# Patient Record
Sex: FEMALE | Race: WHITE | Employment: OTHER | URBAN - METROPOLITAN AREA
[De-identification: names, ages, dates, MRNs, and addresses within clinical notes are randomized per-mention and may not be internally consistent; named-entity substitution may affect disease eponyms.]

---

## 2017-08-27 ENCOUNTER — HOSPITAL ENCOUNTER (EMERGENCY)
Age: 77
Discharge: HOME | End: 2017-08-27
Attending: EMERGENCY MEDICINE
Payer: MEDICARE

## 2017-08-27 VITALS
HEART RATE: 91 BPM | TEMPERATURE: 98.2 F | SYSTOLIC BLOOD PRESSURE: 128 MMHG | RESPIRATION RATE: 16 BRPM | OXYGEN SATURATION: 97 % | DIASTOLIC BLOOD PRESSURE: 67 MMHG

## 2017-08-27 DIAGNOSIS — S61.112A LACERATION OF LEFT THUMB WITHOUT FOREIGN BODY WITH DAMAGE TO NAIL, INITIAL ENCOUNTER: Primary | ICD-10-CM

## 2017-08-27 PROCEDURE — 2500000003 HC RX 250 WO HCPCS: Performed by: PHYSICIAN ASSISTANT

## 2017-08-27 PROCEDURE — 99282 EMERGENCY DEPT VISIT SF MDM: CPT

## 2017-08-27 PROCEDURE — 12001 RPR S/N/AX/GEN/TRNK 2.5CM/<: CPT

## 2017-08-27 PROCEDURE — 6370000000 HC RX 637 (ALT 250 FOR IP): Performed by: PHYSICIAN ASSISTANT

## 2017-08-27 RX ORDER — GINSENG 100 MG
CAPSULE ORAL ONCE
Status: COMPLETED | OUTPATIENT
Start: 2017-08-27 | End: 2017-08-27

## 2017-08-27 RX ORDER — LIDOCAINE HYDROCHLORIDE 10 MG/ML
5 INJECTION, SOLUTION EPIDURAL; INFILTRATION; INTRACAUDAL; PERINEURAL ONCE
Status: COMPLETED | OUTPATIENT
Start: 2017-08-27 | End: 2017-08-27

## 2017-08-27 RX ADMIN — LIDOCAINE HYDROCHLORIDE 5 ML: 10 INJECTION, SOLUTION EPIDURAL; INFILTRATION; INTRACAUDAL; PERINEURAL at 15:20

## 2017-08-27 RX ADMIN — BACITRACIN: 500 OINTMENT TOPICAL at 15:55

## 2017-08-27 ASSESSMENT — PAIN DESCRIPTION - LOCATION: LOCATION: HAND

## 2017-08-27 ASSESSMENT — ENCOUNTER SYMPTOMS
EYE REDNESS: 0
ABDOMINAL PAIN: 0
NAUSEA: 0
SHORTNESS OF BREATH: 0
COUGH: 0
EYE DISCHARGE: 0
BACK PAIN: 0
SORE THROAT: 0
VOMITING: 0

## 2017-08-27 ASSESSMENT — PAIN SCALES - GENERAL: PAINLEVEL_OUTOF10: 2

## 2017-08-27 ASSESSMENT — PAIN DESCRIPTION - PAIN TYPE: TYPE: ACUTE PAIN

## 2017-08-27 ASSESSMENT — PAIN DESCRIPTION - FREQUENCY: FREQUENCY: CONTINUOUS

## 2017-08-27 ASSESSMENT — PAIN DESCRIPTION - ORIENTATION: ORIENTATION: LEFT

## 2017-08-27 ASSESSMENT — PAIN DESCRIPTION - DESCRIPTORS: DESCRIPTORS: ACHING

## 2019-07-31 ENCOUNTER — HOSPITAL ENCOUNTER (OUTPATIENT)
Age: 79
Setting detail: SPECIMEN
Discharge: HOME | End: 2019-07-31
Payer: MEDICARE

## 2019-07-31 PROCEDURE — 83993 ASSAY FOR CALPROTECTIN FECAL: CPT

## 2019-07-31 PROCEDURE — 87493 C DIFF AMPLIFIED PROBE: CPT

## 2019-08-01 LAB
C DIFFICILE TOXINS, PCR: NORMAL
SPECIMEN DESCRIPTION: NORMAL

## 2019-08-03 LAB — CALPROTECTIN, FECAL: 40 UG/G

## 2021-12-27 RX ORDER — ONDANSETRON 2 MG/ML
8 INJECTION INTRAMUSCULAR; INTRAVENOUS
OUTPATIENT
Start: 2021-12-27

## 2021-12-27 RX ORDER — SODIUM CHLORIDE 9 MG/ML
INJECTION, SOLUTION INTRAVENOUS CONTINUOUS
OUTPATIENT
Start: 2021-12-27

## 2021-12-27 RX ORDER — ACETAMINOPHEN 325 MG/1
650 TABLET ORAL
OUTPATIENT
Start: 2021-12-27

## 2021-12-27 RX ORDER — SODIUM CHLORIDE 9 MG/ML
25 INJECTION, SOLUTION INTRAVENOUS PRN
OUTPATIENT
Start: 2021-12-27

## 2021-12-27 RX ORDER — DIPHENHYDRAMINE HYDROCHLORIDE 50 MG/ML
50 INJECTION INTRAMUSCULAR; INTRAVENOUS
OUTPATIENT
Start: 2021-12-27

## 2021-12-27 RX ORDER — SODIUM CHLORIDE 9 MG/ML
INJECTION, SOLUTION INTRAVENOUS ONCE
OUTPATIENT
Start: 2021-12-27 | End: 2021-12-27

## 2021-12-27 RX ORDER — SODIUM CHLORIDE 0.9 % (FLUSH) 0.9 %
5-40 SYRINGE (ML) INJECTION PRN
OUTPATIENT
Start: 2021-12-27

## 2021-12-27 RX ORDER — ALBUTEROL SULFATE 90 UG/1
4 AEROSOL, METERED RESPIRATORY (INHALATION) PRN
OUTPATIENT
Start: 2021-12-27

## 2021-12-27 RX ORDER — EPINEPHRINE 1 MG/ML
0.3 INJECTION, SOLUTION, CONCENTRATE INTRAVENOUS PRN
OUTPATIENT
Start: 2021-12-27

## 2021-12-27 RX ORDER — HEPARIN SODIUM (PORCINE) LOCK FLUSH IV SOLN 100 UNIT/ML 100 UNIT/ML
500 SOLUTION INTRAVENOUS PRN
OUTPATIENT
Start: 2021-12-27

## 2021-12-27 RX ORDER — ZOLEDRONIC ACID 5 MG/100ML
5 INJECTION, SOLUTION INTRAVENOUS ONCE
OUTPATIENT
Start: 2021-12-27 | End: 2021-12-27

## 2022-01-04 ENCOUNTER — TELEPHONE (OUTPATIENT)
Dept: ONCOLOGY | Age: 82
End: 2022-01-04

## 2022-03-28 RX ORDER — DORZOLAMIDE HCL 20 MG/ML
1 SOLUTION/ DROPS OPHTHALMIC DAILY
COMMUNITY

## 2022-03-30 ENCOUNTER — HOSPITAL ENCOUNTER (OUTPATIENT)
Age: 82
Setting detail: OUTPATIENT SURGERY
Discharge: HOME | End: 2022-03-30
Attending: PLASTIC SURGERY | Admitting: PLASTIC SURGERY
Payer: MEDICARE

## 2022-03-30 VITALS
BODY MASS INDEX: 27.05 KG/M2 | HEIGHT: 62 IN | TEMPERATURE: 98.3 F | OXYGEN SATURATION: 99 % | WEIGHT: 147 LBS | DIASTOLIC BLOOD PRESSURE: 73 MMHG | HEART RATE: 58 BPM | SYSTOLIC BLOOD PRESSURE: 145 MMHG

## 2022-03-30 PROCEDURE — 2709999900 HC NON-CHARGEABLE SUPPLY: Performed by: PLASTIC SURGERY

## 2022-03-30 PROCEDURE — 7100000010 HC PHASE II RECOVERY - FIRST 15 MIN: Performed by: PLASTIC SURGERY

## 2022-03-30 PROCEDURE — 3600000012 HC SURGERY LEVEL 2 ADDTL 15MIN: Performed by: PLASTIC SURGERY

## 2022-03-30 PROCEDURE — 2500000003 HC RX 250 WO HCPCS: Performed by: PLASTIC SURGERY

## 2022-03-30 PROCEDURE — 7100000011 HC PHASE II RECOVERY - ADDTL 15 MIN: Performed by: PLASTIC SURGERY

## 2022-03-30 PROCEDURE — 88305 TISSUE EXAM BY PATHOLOGIST: CPT

## 2022-03-30 PROCEDURE — 3600000002 HC SURGERY LEVEL 2 BASE: Performed by: PLASTIC SURGERY

## 2022-03-30 ASSESSMENT — PAIN SCALES - GENERAL: PAINLEVEL_OUTOF10: 0

## 2022-03-30 NOTE — H&P
Subjective:      Patient ID: Jaxon Mehta is a 79 y.o. female.     HPI  Patient presents today lesion on the back.     Medical History      Medical History    Diagnosis Date Comment Source   Anxiety      Basal cell carcinoma of skin of lower limb, including hip 10/22/2014     Basal cell carcinoma of skin of other and unspecified parts of face 10/22/2014     Cerebral artery occlusion with cerebral infarction [facility]) 2017     Colitis      Glaucoma           Other Medical History    Diagnosis Date Comment Source   Diarrhea  malabsorption           Family History    No family history on file. Social History      Tobacco History    Smoking Status   Never Smoker   Smokeless Tobacco Use   Never Used     Alcohol History    Alcohol Use Status   No     Drug Use    Drug Use Status   No     Sexual Activity    Sexually Active   Not Asked      Surgical History    Procedure Laterality Date Comment Source   ANKLE SURGERY       APPENDECTOMY       COLONOSCOPY       CYST REMOVAL Left 1994 wrist    MANDIBLE SURGERY   correction of overbite    SKIN BIOPSY  1992 upper back--nodular subepidermal fibroma, back, neck--epidermal inclusion cyst    SKIN BIOPSY  2009 nose--bcc    SKIN BIOPSY  2012 rt shin--bcc, rt cheek--epidermoid cyst    TUBAL LIGATION         E-Cigarettes/Vaping Use    Questions Responses   E-Cigarette/Vaping Use    Start Date    Passive Exposure    Quit Date    Counseling Given    Comments      Socioeconomic History      Employment History    No employment history on file. Family and Education    Marital Status        Social Identity    Preferred Language Ethnicity Race   English Non- / Non  White (non-)             No current facility-administered medications on file prior to encounter.      Current Outpatient Medications on File Prior to Encounter   Medication Sig Dispense Refill    dorzolamide (TRUSOPT) 2 % ophthalmic solution dorzolamide 2 % eye drops   INSTILL 1 DROP INTO BOTH EYES TWICE A DAY      lamoTRIgine (LAMICTAL) 150 MG tablet Take 150 mg by mouth daily.  APRISO 0.375 G capsule Take 1.5 g by mouth daily.  methylphenidate (RITALIN) 5 MG tablet Take 5 mg by mouth See Admin Instructions. 5 mg in morning, 2.5 mg in evening.  sertraline (ZOLOFT) 25 MG tablet Take 12.5 mg by mouth daily. (Patient not taking: Reported on 3/28/2022)      timolol (TIMOPTIC) 0.5 % ophthalmic solution Place 1 drop into both eyes daily.  calcium carbonate (OSCAL) 500 MG TABS tablet Take 500 mg by mouth daily.  Vitamin D (CHOLECALCIFEROL) 1000 UNITS CAPS capsule Take 3,000 Units by mouth daily.  DULoxetine (CYMBALTA) 60 MG capsule Take 60 mg by mouth daily.  Ascorbic Acid (VITAMIN C) 500 MG tablet Take 500 mg by mouth daily.  Multiple Vitamins-Minerals (DAILY MULTIPLE VITAMINS/MIN PO) Take 1 tablet by mouth daily.            Objective:   Physical Exam  Vitals and nursing note reviewed. Exam conducted with a chaperone present. Constitutional:       Appearance: Normal appearance. HENT:      Head: Normocephalic and atraumatic. Mouth/Throat:      Mouth: Mucous membranes are moist.   Eyes:      Extraocular Movements: Extraocular movements intact. Conjunctiva/sclera: Conjunctivae normal.      Pupils: Pupils are equal, round, and reactive to light. Pulmonary:      Effort: Pulmonary effort is normal.   Musculoskeletal:        Back:    Skin:     General: Skin is warm and dry. Neurological:      General: No focal deficit present. Mental Status: She is alert and oriented to person, place, and time.       Checked over head, neck, shoulders, upper extremities abdomen and back. Multiple SK's and lesion as diagrammed.     Assessment:   Suspicious lesion of back. Plan:   Scheduled for excision.   I have discussed with the patient the indication, alternatives, and the possible risks and/or complications which include but are not limited to those delineated on the consent form for the planned procedure and the anesthesia methods. All questions were answered to patient's satisfaction. Consent was reviewed and signed.

## 2022-03-31 NOTE — OP NOTE
[address]                  [facility]                                OPERATIVE REPORT    PATIENT NAME: Charles De La Rosa                  :        1940  MED REC NO:   [id number]                             ROOM:  ACCOUNT NO:   [de-identified]                           ADMIT DATE: 2022  PROVIDER:     Sara Vazquez    DATE OF PROCEDURE:  2022    PREOPERATIVE DIAGNOSIS:  Suspicious lesion of back. POSTOPERATIVE DIAGNOSIS:  Suspicious lesion of back. PROCEDURE:  Excision of lesion of back (2.0 cm), taken with a 4-mm  margin and with intermediate repair of 3.5 cm length. SURGEON:  Sara Vazquez MD    ANESTHESIA:  Local 1% Xylocaine with epinephrine, buffered. INDICATIONS:  The patient is an 79-year-old white female who presents  with a suspicious lesion on her back, here for removal and diagnosis. The procedure, the risks, the benefits were discussed with her. All  questions were answered to her satisfaction. Consent was signed. NARRATIVE SUMMARY:  The patient was brought to the operating suite,  placed in the prone position. She was then prepped and draped in usual  sterile fashion. Initially, using marker, outline was made for the  excision along with margin. Local anesthetic infiltrated. Next, with  scalpel, incision was carried out around the perimeter of the margin and  deepened through the full-thickness of the skin. It was then excised  inclusive of underlying subcutaneous tissues and sent off as specimen. Bovie cautery was used for hemostasis. Wound closed in layers with  interrupted 4-0 Vicryl in the subcutaneous and running intracuticular  4-0 Prolene in the skin. Steri-Strips for dressing. The patient  tolerated the procedure well. Blood loss minimal.  Specimens x1. Complications none. The patient was transferred to Recovery in stable  condition.         Morro Tao    D: 03/30/2022 16:50:22       T: 03/30/2022 16:52:51     [id number]  Job#: [id number]     Doc#: [id number]    CC:

## 2022-04-01 LAB — DERMATOLOGY PATHOLOGY REPORT: NORMAL

## 2022-04-20 PROBLEM — C44.519 BASAL CELL CARCINOMA OF BACK: Status: ACTIVE | Noted: 2022-04-20

## 2022-04-25 ENCOUNTER — HOSPITAL ENCOUNTER (EMERGENCY)
Age: 82
Discharge: HOME | End: 2022-04-25
Attending: EMERGENCY MEDICINE
Payer: MEDICARE

## 2022-04-25 ENCOUNTER — APPOINTMENT (OUTPATIENT)
Dept: GENERAL RADIOLOGY | Age: 82
End: 2022-04-25
Payer: MEDICARE

## 2022-04-25 VITALS
HEIGHT: 62 IN | OXYGEN SATURATION: 97 % | HEART RATE: 62 BPM | DIASTOLIC BLOOD PRESSURE: 72 MMHG | SYSTOLIC BLOOD PRESSURE: 172 MMHG | RESPIRATION RATE: 14 BRPM | TEMPERATURE: 98.3 F | WEIGHT: 145 LBS | BODY MASS INDEX: 26.68 KG/M2

## 2022-04-25 DIAGNOSIS — S61.213A LACERATION OF LEFT MIDDLE FINGER WITHOUT FOREIGN BODY WITHOUT DAMAGE TO NAIL, INITIAL ENCOUNTER: Primary | ICD-10-CM

## 2022-04-25 PROCEDURE — 73130 X-RAY EXAM OF HAND: CPT

## 2022-04-25 PROCEDURE — 99283 EMERGENCY DEPT VISIT LOW MDM: CPT

## 2022-04-25 PROCEDURE — 12001 RPR S/N/AX/GEN/TRNK 2.5CM/<: CPT

## 2022-04-25 ASSESSMENT — ENCOUNTER SYMPTOMS
EYES NEGATIVE: 1
GASTROINTESTINAL NEGATIVE: 1
RESPIRATORY NEGATIVE: 1

## 2022-04-25 ASSESSMENT — PAIN - FUNCTIONAL ASSESSMENT: PAIN_FUNCTIONAL_ASSESSMENT: NONE - DENIES PAIN

## 2022-04-25 ASSESSMENT — PAIN SCALES - GENERAL: PAINLEVEL_OUTOF10: 0

## 2022-04-25 ASSESSMENT — PAIN DESCRIPTION - ORIENTATION: ORIENTATION: LEFT

## 2022-04-25 ASSESSMENT — PAIN DESCRIPTION - ONSET: ONSET: SUDDEN

## 2022-04-25 ASSESSMENT — PAIN DESCRIPTION - PAIN TYPE: TYPE: ACUTE PAIN

## 2022-04-25 ASSESSMENT — PAIN DESCRIPTION - LOCATION: LOCATION: FINGER (COMMENT WHICH ONE)

## 2022-04-25 NOTE — ED PROVIDER NOTES
[facility] ED  eMERGENCY dEPARTMENT eNCOUnter   Independent Attestation     Pt Name: Ashutosh Madrid  MRN: [id number]  Armsjosefinagfcrystal 1940  Date of evaluation: 4/25/22       Ashutosh Madrid is a 79 y.o. female who presents with Finger Injury (patient caught her middle finger on her left hand in a reclining chair.)        Based on the medical record, the care appears appropriate. I was personally available for consultation in the Emergency Department.     Sylvia Calixto MD  Attending Emergency  Physician                Sylvia Calixto MD  04/25/22 [id number]

## 2022-04-25 NOTE — ED TRIAGE NOTES
Patient came to the ER with a chief complaint of a finger laceration. Patient got her finger caught in the mechanism of a reclining chair. Patient has a laceration on her left middle finger, no bleeding at time of assessment. Patient was alert and oriented x 4. Patient triaged to room 2, assessed, and updated on the plan of care.

## 2022-04-25 NOTE — ED PROVIDER NOTES
[address] [facility] ED  [address] [facility] [address]  Phone: [phone]  Fax: [phone]        Pt Name: Dorothy Davis  MRN: [id number]  [place] 1940  Date of evaluation: 4/25/22    [address] [place] [address]       Chief Complaint   Patient presents with    Finger Injury     patient caught her middle finger on her left hand in a reclining chair. HISTORY OF PRESENT ILLNESS (Location/Symptom, Timing/Onset, Context/Setting, Quality, Duration, Modifying Factors, Severity)      Dorothy Davis is a 79 y.o. female with no pertinent PMH who presents to the ED via private auto with complaints of a laceration to the left middle finger. Patient states she was trying to open a recliner, when her finger got stuck, in the mechanisms, she states she pulled her finger out very quickly and she noticed a cut on her left middle finger. She denies any pain currently, states it really only hurts when it is touched. She states that the incident occurred last night. She denies any numbness or tingling in her extremities. She states she is up-to-date on her tetanus immunization. She denies any dizziness or lightheadedness. Denies any chest pain shortness of breath. She denies any fever chills or body aches. Patient is right-hand dominant. PAST MEDICAL / SURGICAL / SOCIAL / FAMILY HISTORY     PMH:  has a past medical history of Anxiety, Basal cell carcinoma of skin of lower limb, including hip, Basal cell carcinoma of skin of other and unspecified parts of face, Cerebral artery occlusion with cerebral infarction ([facility] [address] Colitis, Diarrhea, and Glaucoma. Surgical History:  has a past surgical history that includes Appendectomy; skin biopsy (1992); cyst removal (Left, 1994); skin biopsy (2009); skin biopsy (2012); Ankle surgery; Colonoscopy; Tubal ligation; Mandible surgery; pre-malignant / benign skin lesion excision (03/30/2022); and skin biopsy (N/A, 3/30/2022).   Social History:  reports that she has never smoked. She has never used smokeless tobacco. She reports that she does not drink alcohol and does not use drugs. Family History: has no family status information on file. family history is not on file. Psychiatric History: None    Allergies: Latex, Ciprofloxacin, and Ketorolac    Home Medications:   Prior to Admission medications    Medication Sig Start Date End Date Taking? Authorizing Provider   dorzolamide (TRUSOPT) 2 % ophthalmic solution dorzolamide 2 % eye drops   INSTILL 1 DROP INTO BOTH EYES TWICE A DAY    Historical Provider, MD   cephALEXin (KEFLEX) 250 MG capsule Take one PO QID. Start day before surgery. 3/10/22   Kirby Mccurdy MD   lamoTRIgine (LAMICTAL) 150 MG tablet Take 150 mg by mouth daily. 10/22/14   Historical Provider, MD   APRISO 0.375 G capsule Take 1.5 g by mouth daily. 10/20/14   Historical Provider, MD   methylphenidate (RITALIN) 5 MG tablet Take 5 mg by mouth See Admin Instructions. 5 mg in morning, 2.5 mg in evening. 10/20/14   Historical Provider, MD   sertraline (ZOLOFT) 25 MG tablet Take 12.5 mg by mouth daily. Patient not taking: Reported on 3/28/2022 10/22/14   Historical Provider, MD   timolol (TIMOPTIC) 0.5 % ophthalmic solution Place 1 drop into both eyes daily. 10/15/14   Historical Provider, MD   calcium carbonate (OSCAL) 500 MG TABS tablet Take 500 mg by mouth daily. Historical Provider, MD   Vitamin D (CHOLECALCIFEROL) 1000 UNITS CAPS capsule Take 3,000 Units by mouth daily. Historical Provider, MD   DULoxetine (CYMBALTA) 60 MG capsule Take 60 mg by mouth daily. Historical Provider, MD   Ascorbic Acid (VITAMIN C) 500 MG tablet Take 500 mg by mouth daily. Historical Provider, MD   Multiple Vitamins-Minerals (DAILY MULTIPLE VITAMINS/MIN PO) Take 1 tablet by mouth daily. Historical Provider, MD       REVIEW OF SYSTEMS  (2-9 systems for level 4, 10 ormore for level 5)      Review of Systems   Constitutional: Negative.     HENT: Negative. Eyes: Negative. Respiratory: Negative. Cardiovascular: Negative. Gastrointestinal: Negative. Endocrine: Negative. Genitourinary: Negative. Musculoskeletal:        Left middle finger injury   Skin:        Laceration to left middle finger   Neurological: Negative. Hematological: Negative. Psychiatric/Behavioral: Negative. All other systems negative except as marked. PHYSICAL EXAM  (up to 7 for level 4, 8 or more for level 5)      INITIAL VITALS:  height is 5' 2\" (1.575 m) and weight is 65.8 kg (145 lb). Her temperature is 98.3 °F (36.8 °C). Her blood pressure is 172/72 (abnormal) and her pulse is 62. Her respiration is 14 and oxygen saturation is 97%. Vital signs reviewed. Physical Exam  Constitutional:       Appearance: Normal appearance. HENT:      Head: Normocephalic. Right Ear: Tympanic membrane, ear canal and external ear normal.      Left Ear: Tympanic membrane, ear canal and external ear normal.      Nose: Nose normal.      Mouth/Throat:      Mouth: Mucous membranes are moist.      Pharynx: Oropharynx is clear. Eyes:      Extraocular Movements: Extraocular movements intact. Pupils: Pupils are equal, round, and reactive to light. Cardiovascular:      Rate and Rhythm: Normal rate and regular rhythm. Pulses: Normal pulses. Heart sounds: Normal heart sounds. Pulmonary:      Effort: Pulmonary effort is normal.      Breath sounds: Normal breath sounds. Abdominal:      General: Bowel sounds are normal.      Palpations: Abdomen is soft. Musculoskeletal:         General: Normal range of motion. Cervical back: Normal range of motion. Skin:     General: Skin is warm and dry. Capillary Refill: Capillary refill takes less than 2 seconds.       Comments: 1 cm laceration noted to the palmar aspect of patient's left middle finger, over the distal phalanx   Neurological:      Mental Status: She is alert and oriented to person, place, and time. Psychiatric:         Mood and Affect: Mood normal.         Behavior: Behavior normal.         Thought Content: Thought content normal.         Judgment: Judgment normal.           DIFFERENTIAL DIAGNOSIS / MDM     Upon exam, patient is resting comfortably in her room. Denies need for any pain medication at this time. She has full range of motion of her left hand and fingers. She denies any numbness or tingling in her extremities. Heart sounds within normal limits upon auscultation. Lung sounds are clear and equal bilaterally. Bowel sounds are present with auscultation. Upon examination of the left hand, there is a 1 cm laceration noted to the palmar aspect of patient's left middle finger, over the distal phalanx. Patient has good capillary refill. She has full range of motion of the finger. There is some surrounding swelling noted as well. No signs of infection including red streaking, purulent discharge, surrounding erythema. As stated above, patient is up-to-date on her tetanus immunization. I will order an x-ray of the left hand to rule out fracture dislocation. X-ray of the left hand is negative for fracture. Dermabond applied to the laceration. Laceration came down nicely. Patient has good capillary refill following the procedure. No signs of nail damage or foreign body noted during irrigation. Band-Aid applied to the site. Patient is encouraged to avoid submerging her hand, will provide information on adhesives on the discharge instructions. Patient states understanding of education and is stable for outpatient follow-up with her PCP. She is encouraged to return the emergency room with any new concerning or worsening symptoms. Patient educated on signs infection including red streaking, increasing swelling, purulent discharge, surrounding erythema. Patient states understanding of education.     PLAN (LABS / IMAGING / EKG):  Orders Placed This Encounter Procedures    XR HAND LEFT (MIN 3 VIEWS)       MEDICATIONS ORDERED:  No orders of the defined types were placed in this encounter. Controlled Substances Monitoring:     DIAGNOSTIC RESULTS     EKG: All EKG's are interpreted by the Emergency Department Physician who either signs or Co-signs this chart in the absenceof a cardiologist.      RADIOLOGY: All images are read by the radiologist and their interpretations are reviewed. XR HAND LEFT (MIN 3 VIEWS)   Final Result   No fracture             No results found. LABS:  No results found for this visit on 04/25/22. EMERGENCY DEPARTMENT COURSE           Vitals:    Vitals:    04/25/22 1350   BP: (!) 172/72   Pulse: 62   Resp: 14   Temp: 98.3 °F (36.8 °C)   SpO2: 97%   Weight: 65.8 kg (145 lb)   Height: 5' 2\" (1.575 m)     -------------------------  BP: (!) 172/72, Temp: 98.3 °F (36.8 °C), Pulse: 62, Resp: 14      RE-EVALUATION:  See ED Course notes above. CONSULTS:  None    PROCEDURES:  Dermabond applied to the roughly 1 cm laceration noted to the left middle finger, palmar aspect. Patient tolerated this well, good capillary refill following the application. Band-Aid applied. No signs of cyanosis or change in skin temp noted. FINAL IMPRESSION      1. Laceration of left middle finger without foreign body without damage to nail, initial encounter          DISPOSITION / PLAN     CONDITION ON DISPOSITION:   Good / Stable for discharge. PATIENT REFERRED TO:  Ophelia Kim MD  [address]  [address] [facility] [phone]    Call   As needed    [facility] ED  [address]   [address] [address]  [phone]  Go to   If symptoms worsen      DISCHARGE MEDICATIONS:  Discharge Medication List as of 4/25/2022  3:22 PM          OMAR Gutierrez - NP   Emergency Medicine Nurse Practitioner    (Please note that portions of this note were completed with a voice recognition program.  Efforts were made to edit the dictations but occasionally words aremis-transcribed.)     OMAR Kincaid NP  04/25/22 1038

## 2022-09-11 ENCOUNTER — APPOINTMENT (OUTPATIENT)
Dept: CT IMAGING | Age: 82
DRG: 835 | End: 2022-09-11
Payer: MEDICARE

## 2022-09-11 ENCOUNTER — APPOINTMENT (OUTPATIENT)
Dept: GENERAL RADIOLOGY | Age: 82
DRG: 835 | End: 2022-09-11
Payer: MEDICARE

## 2022-09-11 ENCOUNTER — HOSPITAL ENCOUNTER (INPATIENT)
Age: 82
LOS: 1 days | Discharge: HOSPICE | DRG: 835 | End: 2022-09-13
Attending: EMERGENCY MEDICINE | Admitting: STUDENT IN AN ORGANIZED HEALTH CARE EDUCATION/TRAINING PROGRAM
Payer: MEDICARE

## 2022-09-11 DIAGNOSIS — J18.9 PNEUMONIA OF RIGHT LUNG DUE TO INFECTIOUS ORGANISM, UNSPECIFIED PART OF LUNG: ICD-10-CM

## 2022-09-11 DIAGNOSIS — S01.01XA LACERATION OF SCALP, INITIAL ENCOUNTER: ICD-10-CM
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
## 2022-09-11 DIAGNOSIS — D64.9 ANEMIA, UNSPECIFIED TYPE: ICD-10-CM

## 2022-09-11 DIAGNOSIS — J18.9 PNEUMONIA DUE TO INFECTIOUS ORGANISM, UNSPECIFIED LATERALITY, UNSPECIFIED PART OF LUNG: ICD-10-CM

## 2022-09-11 DIAGNOSIS — J90 PLEURAL EFFUSION ON RIGHT: ICD-10-CM

## 2022-09-11 DIAGNOSIS — J18.9 PNEUMONIA OF RIGHT LOWER LOBE DUE TO INFECTIOUS ORGANISM: ICD-10-CM

## 2022-09-11 LAB
ANION GAP SERPL CALCULATED.3IONS-SCNC: 9 MMOL/L (ref 9–17)
BUN BLDV-MCNC: 30 MG/DL (ref 8–23)
CALCIUM SERPL-MCNC: 8.4 MG/DL (ref 8.6–10.4)
CHLORIDE BLD-SCNC: 94 MMOL/L (ref 98–107)
CO2: 27 MMOL/L (ref 20–31)
CREAT SERPL-MCNC: 0.59 MG/DL (ref 0.5–0.9)
GFR AFRICAN AMERICAN: >60 ML/MIN
GFR NON-AFRICAN AMERICAN: >60 ML/MIN
GFR SERPL CREATININE-BSD FRML MDRD: ABNORMAL ML/MIN/{1.73_M2}
GLUCOSE BLD-MCNC: 118 MG/DL (ref 70–99)
HCT VFR BLD CALC: 19.4 % (ref 36–46)
HEMOGLOBIN: 6.4 G/DL (ref 12–16)
POTASSIUM SERPL-SCNC: 4.2 MMOL/L (ref 3.7–5.3)
SODIUM BLD-SCNC: 130 MMOL/L (ref 135–144)

## 2022-09-11 PROCEDURE — 85025 COMPLETE CBC W/AUTO DIFF WBC: CPT

## 2022-09-11 PROCEDURE — 86920 COMPATIBILITY TEST SPIN: CPT

## 2022-09-11 PROCEDURE — 86902 BLOOD TYPE ANTIGEN DONOR EA: CPT

## 2022-09-11 PROCEDURE — 70450 CT HEAD/BRAIN W/O DYE: CPT

## 2022-09-11 PROCEDURE — 36415 COLL VENOUS BLD VENIPUNCTURE: CPT

## 2022-09-11 PROCEDURE — 85018 HEMOGLOBIN: CPT

## 2022-09-11 PROCEDURE — 80048 BASIC METABOLIC PNL TOTAL CA: CPT

## 2022-09-11 PROCEDURE — 71046 X-RAY EXAM CHEST 2 VIEWS: CPT

## 2022-09-11 PROCEDURE — 84145 PROCALCITONIN (PCT): CPT

## 2022-09-11 PROCEDURE — 86870 RBC ANTIBODY IDENTIFICATION: CPT

## 2022-09-11 PROCEDURE — 99285 EMERGENCY DEPT VISIT HI MDM: CPT

## 2022-09-11 PROCEDURE — 72125 CT NECK SPINE W/O DYE: CPT

## 2022-09-11 PROCEDURE — 86880 COOMBS TEST DIRECT: CPT

## 2022-09-11 PROCEDURE — 85014 HEMATOCRIT: CPT

## 2022-09-11 PROCEDURE — 86900 BLOOD TYPING SEROLOGIC ABO: CPT

## 2022-09-11 PROCEDURE — 86901 BLOOD TYPING SEROLOGIC RH(D): CPT

## 2022-09-11 PROCEDURE — 86850 RBC ANTIBODY SCREEN: CPT

## 2022-09-11 RX ORDER — ALPRAZOLAM 0.25 MG/1
TABLET ORAL
Status: ON HOLD | COMMUNITY
Start: 2022-08-28 | End: 2022-09-12 | Stop reason: HOSPADM

## 2022-09-11 RX ORDER — CHOLECALCIFEROL (VITAMIN D3) 125 MCG
10 CAPSULE ORAL
COMMUNITY
Start: 2022-08-29 | End: 2022-09-12

## 2022-09-11 RX ORDER — SODIUM CHLORIDE 9 MG/ML
INJECTION, SOLUTION INTRAVENOUS PRN
Status: DISCONTINUED | OUTPATIENT
Start: 2022-09-11 | End: 2022-09-13 | Stop reason: HOSPADM

## 2022-09-11 ASSESSMENT — PAIN - FUNCTIONAL ASSESSMENT: PAIN_FUNCTIONAL_ASSESSMENT: ADULT NONVERBAL PAIN SCALE (NPVS)

## 2022-09-11 NOTE — Clinical Note
Discharge Plan[de-identified] Other/[facility])   Telemetry/Cardiac Monitoring Required?: Yes

## 2022-09-11 NOTE — ED NOTES
Annie TURCIOS ok with lab draw vs peripheral iv , lab at bedside      Leatha Fletcher RN  09/11/22 1913

## 2022-09-11 NOTE — ED PROVIDER NOTES
[id number] [facility] ED  [id number] [facility] [address]  Phone: [phone]  Fax: [phone]      Attending Physician Attestation    I performed a history and physical examination of the patient and discussed management with the mid level provider. I reviewed the mid level provider's note and agree with the documented findings and plan of care. Any areas of disagreement are noted on the chart. I was personally present for the key portions of any procedures. I have documented in the chart those procedures where I was not present during the key portions. I have reviewed the emergency nurses triage note. I agree with the chief complaint, past medical history, past surgical history, allergies, medications, social and family history as documented unless otherwise noted below. Documentation of the HPI, Physical Exam and Medical Decision Making performed by mid level providers is based on my personal performance of the HPI, PE and MDM. For Physician Assistant/ Nurse Practitioner cases/documentation I have personally evaluated this patient and have completed at least one if not all key elements of the E/M (history, physical exam, and MDM). Additional findings are as noted. CHIEF COMPLAINT       Chief Complaint   Patient presents with    Head Injury         HISTORY OF PRESENT ILLNESS    Odalys Page is a 79 y.o. female with history of AML, CVA, anxiety, depression, colitis, and anemia who is on hospice who presents for evaluation after a fall. The patient had a unwitnessed fall while her family was outside and they found her lying in the kitchen bleeding from her head. The patient hit the edge of the baseboard when she fell and has a laceration to her left parietal scalp. She does not take any anticoagulants. The patient fell from standing. She is in hospice but upon arrival the patient's family states that she is full code.   They have been transfusing her blood secondary to worsening anemia. It is unclear if the patient lost consciousness. She complains of pain around the laceration. The patient denies any provoking or palliating factors. She has not taken any medications prior to arrival.  Her tetanus shot is up-to-date. The patient denies fever, chills, vision changes, neck pain, back pain, chest pain, shortness of breath, abdominal pain, urinary/bowel symptoms, nausea, vomiting, or recent illness. PAST MEDICAL HISTORY    has a past medical history of Anxiety, Basal cell carcinoma of skin of lower limb, including hip, Basal cell carcinoma of skin of other and unspecified parts of face, Cerebral artery occlusion with cerebral infarction ([facility] [address] Colitis, Diarrhea, and Glaucoma. SURGICAL HISTORY      has a past surgical history that includes Appendectomy; skin biopsy (1992); cyst removal (Left, 1994); skin biopsy (2009); skin biopsy (2012); Ankle surgery; Colonoscopy; Tubal ligation; Mandible surgery; pre-malignant / benign skin lesion excision (03/30/2022); and skin biopsy (N/A, 3/30/2022). CURRENT MEDICATIONS       Previous Medications    ALPRAZOLAM (XANAX) 0.25 MG TABLET    Take by mouth. APRISO 0.375 G CAPSULE    Take 1.5 g by mouth daily. ASCORBIC ACID (VITAMIN C) 500 MG TABLET    Take 500 mg by mouth daily. CALCIUM CARBONATE (OSCAL) 500 MG TABS TABLET    Take 500 mg by mouth daily. CEPHALEXIN (KEFLEX) 250 MG CAPSULE    Take one PO QID. Start day before surgery. DORZOLAMIDE (TRUSOPT) 2 % OPHTHALMIC SOLUTION    dorzolamide 2 % eye drops   INSTILL 1 DROP INTO BOTH EYES TWICE A DAY    DULOXETINE (CYMBALTA) 60 MG CAPSULE    Take 60 mg by mouth daily. LAMOTRIGINE (LAMICTAL) 150 MG TABLET    Take 150 mg by mouth daily. MELATONIN 5 MG TABS TABLET    Take 10 mg by mouth    METHYLPHENIDATE (RITALIN) 5 MG TABLET    Take 5 mg by mouth See Admin Instructions. 5 mg in morning, 2.5 mg in evening.     MULTIPLE VITAMINS-MINERALS (DAILY MULTIPLE VITAMINS/MIN PO) Take 1 tablet by mouth daily. SERTRALINE (ZOLOFT) 25 MG TABLET    Take 12.5 mg by mouth daily. TIMOLOL (TIMOPTIC) 0.5 % OPHTHALMIC SOLUTION    Place 1 drop into both eyes daily. VITAMIN D (CHOLECALCIFEROL) 1000 UNITS CAPS CAPSULE    Take 3,000 Units by mouth daily. ALLERGIES     is allergic to latex, ciprofloxacin, and ketorolac. FAMILY HISTORY     has no family status information on file. family history is not on file. SOCIAL HISTORY      reports that she has never smoked. She has never used smokeless tobacco. She reports that she does not drink alcohol and does not use drugs. PHYSICAL EXAM     INITIAL VITALS:  height is 5' 2\" (1.575 m) and weight is 54.4 kg (120 lb). Her tympanic temperature is 97.8 °F (36.6 °C). Her blood pressure is 114/63 and her pulse is 99. Her respiration is 18 and oxygen saturation is 100%. Heart regular rate and rhythm. Lungs clear to auscultation. Abdomen soft and nontender. No midline spinal tenderness to palpation, step-off or deformity. There is a small laceration noted to the left parietal scalp with active bleeding. No hemotympanum, storng sign, raccoon eyes or septal hematoma. Pupils 3 mm, equal, round reactive to light. Extraocular movements intact. Normal  strength bilaterally. Able to move all 4 extremities. DIAGNOSTIC RESULTS     EKG: All EKG's are interpreted by the Emergency Department Physician who either signs or Co-signs this chart in the absence of a cardiologist.    None    RADIOLOGY:     XR CHEST (2 VW)    Result Date: 9/11/2022  EXAMINATION: TWO XRAY VIEWS OF THE CHEST 9/11/2022 7:04 pm COMPARISON: None. HISTORY: ORDERING SYSTEM PROVIDED HISTORY: right pleural effusion TECHNOLOGIST PROVIDED HISTORY: right pleural effusion Reason for Exam: Right pleural effusion FINDINGS: Right perihilar consolidative infiltrate. Fairly diffuse interstitial infiltrate throughout the remaining right lung.   Moderate right pleural effusion. Left lung is relatively clear. .The cardiac size is mildly enlarged. . Pulmonary vascularity appears congested on the right. There is mild ectasia of the thoracic aorta. There are degenerative changes in the spine and bilateral shoulders. No acute bony abnormalities. Right perihilar consolidative infiltrate. Fairly diffuse interstitial infiltrate throughout the remaining right lung suggesting pneumonia. Moderate right pleural effusion. RECOMMENDATION: Recommend follow-up exams until the infiltrate clears     CT HEAD WO CONTRAST    Result Date: 9/11/2022  EXAMINATION: CT OF THE HEAD WITHOUT CONTRAST  9/11/2022 4:36 pm TECHNIQUE: CT of the head was performed without the administration of intravenous contrast. Automated exposure control, iterative reconstruction, and/or weight based adjustment of the mA/kV was utilized to reduce the radiation dose to as low as reasonably achievable. COMPARISON: None. HISTORY: ORDERING SYSTEM PROVIDED HISTORY: head injury TECHNOLOGIST PROVIDED HISTORY: head injury Decision Support Exception - unselect if not a suspected or confirmed emergency medical condition->Emergency Medical Condition (MA) Reason for Exam: Fall, posterior head injury/laceration CT BRAIN FINDINGS: BRAIN/VENTRICLES: The cerebral hemispheres, brainstem, and cerebellum have a normal appearance for the patient's age. The falx is midline. The ventricles and peripheral sulci are mildly dilated. There is decreased attenuation in the periventricular white matter. There is no sign of a space occupying lesion, infarction, or hemorrhage. Orbits: Portion of the orbits demonstrate no acute abnormality. SINUSES: Mild mucoperiosteal thickening of the maxillary sinuses. .  The remaining imaged portions of the paranasal sinuses are clear. The mastoids and the middle ear chambers are clear. SOFT TISSUES/SKULL:  No acute abnormality of the visualized skull. Left parietal soft tissue swelling. .Vascular calcifications visualization of large volume right pleural effusion. LABS:  Results for orders placed or performed during the hospital encounter of 09/11/22   CBC with Auto Differential   Result Value Ref Range    WBC 3.2 (L) 3.5 - 11.0 k/uL    RBC 2.33 (L) 4.0 - 5.2 m/uL    Hemoglobin 7.1 (L) 12.0 - 16.0 g/dL    Hematocrit 21.6 (L) 36 - 46 %    MCV 92.7 80 - 100 fL    MCH 30.3 26 - 34 pg    MCHC 32.7 31 - 37 g/dL    RDW 19.4 (H) 12.5 - 15.4 %    Platelets 78 (L) 937 - 450 k/uL    MPV 12.0 6.0 - 12.0 fL    Seg Neutrophils PENDING %    Lymphocytes PENDING %    Monocytes PENDING %    Eosinophils % PENDING %    Basophils PENDING %    Segs Absolute PENDING k/uL    Absolute Lymph # PENDING k/uL    Absolute Mono # PENDING k/uL    Absolute Eos # PENDING k/uL    Basophils Absolute PENDING 0.0 - 0.2 k/uL   Basic Metabolic Panel   Result Value Ref Range    Glucose 118 (H) 70 - 99 mg/dL    BUN 30 (H) 8 - 23 mg/dL    Creatinine 0.59 0.50 - 0.90 mg/dL    Calcium 8.4 (L) 8.6 - 10.4 mg/dL    Sodium 130 (L) 135 - 144 mmol/L    Potassium 4.2 3.7 - 5.3 mmol/L    Chloride 94 (L) 98 - 107 mmol/L    CO2 27 20 - 31 mmol/L    Anion Gap 9 9 - 17 mmol/L    GFR Non-African American >60 >60 mL/min    GFR African American >60 >60 mL/min    GFR Comment         Hemoglobin and Hematocrit   Result Value Ref Range    Hemoglobin 6.4 (LL) 12.0 - 16.0 g/dL    Hematocrit 19.4 (L) 36 - 46 %           EMERGENCY DEPARTMENT COURSE:   Vitals:    Vitals:    09/11/22 2113 09/11/22 2128 09/11/22 2143 09/11/22 2158   BP: (!) 124/59 (!) 123/58 124/68 114/63   Pulse:       Resp:       Temp:       TempSrc:       SpO2: 100%      Weight:       Height:         -------------------------  BP: 114/63, Temp: 97.8 °F (36.6 °C), Heart Rate: 99, Resp: 18      PERTINENT ATTENDING PHYSICIAN COMMENTS:    The patient is a 11-year-old female who presents for evaluation of a head injury with scalp laceration after a fall. Vital signs are stable.   She has a left parietal scalp laceration that was bleeding. The wound was anesthetized, cleaned, and closed with simple interrupted sutures and a single figure-of-eight suture to help control bleeding. No signs of basilar skull fracture. No focal neurologic deficits on exam.  She has history of anemia requiring recurrent blood transfusions and family is concerned that she lost a significant amount of blood today which would require a transfusion. The patient is on hospice for lymphoma but arrives today as a full code. After discussion with the family and hospice nurse, the patient's CODE STATUS was changed to DNR CC. The family would still like to pursue CT imaging of the head and neck as well as blood work. CT head shows mild central and cortical cerebral atrophy. Moderate chronic deep white matter ischemic changes. No acute intracranial abnormality. Left parietal scalp hematoma. CT cervical spine shows no acute abnormality. There are diffuse degenerative changes and acquired cervical spinal canal stenosis most severe at C6/7. The patient does not have any cervical tenderness to palpation or complain of any neck pain. There was partial visualization of a large volume right pleural effusion. We subsequently ordered a chest x-ray which shows a right perihilar consolidative infiltrate with fairly diffuse interstitial infiltrate throughout the remaining right lung suggesting pneumonia. There is a moderate right pleural effusion. I subsequently started the patient on Rocephin and azithromycin. The family is concerned for MAC infections. The azithromycin should cover for MAC. CBC shows a hemoglobin of 7.1 with platelets of 78. BMP shows slight hyponatremia and hypochloremia. We repeated the H&H secondary to the patient's blood loss and hemoglobin did drop down to 6.4. The patient was subsequently typed and screened and 1 unit of PRBC was ordered.   The patient has antibodies so her blood had to be sent to [facility] in order to be crossmatched. The patient will be admitted for blood transfusion and treatment of her pneumonia. The family was updated. We spoke to Jovany Zambrano NP, and she agrees to accept the patient for admission to [facility]. CRITICAL CARE: There was a high probability of clinically significant/life threatening deterioration in this patient's condition which required my urgent intervention. Total critical care time was 35 minutes. This excludes any time for separately reportable procedures.          (Please note that portions of this note were completed with a voice recognition program.  Efforts were made to edit the dictations but occasionally words are mis-transcribed.)    Stephanie Aleman DO, [address] [facility]  Attending Emergency Medicine Physician        Stephanie Aleman DO  09/12/22 Sohan Schumacher DO  09/12/22 0142

## 2022-09-12 PROBLEM — D62 ACUTE BLOOD LOSS ANEMIA: Status: ACTIVE | Noted: 2022-09-12
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
## 2022-09-12 PROBLEM — C92.00 ACUTE MYELOBLASTIC LEUKEMIA, NOT HAVING ACHIEVED REMISSION (HCC): Status: ACTIVE | Noted: 2022-08-29

## 2022-09-12 PROBLEM — J90 PLEURAL EFFUSION: Status: ACTIVE | Noted: 2022-09-12

## 2022-09-12 PROBLEM — D64.9 ANEMIA: Status: ACTIVE | Noted: 2022-09-12

## 2022-09-12 PROBLEM — J18.9 PNEUMONIA DUE TO INFECTIOUS ORGANISM: Status: ACTIVE | Noted: 2022-09-12

## 2022-09-12 PROBLEM — D61.818 PANCYTOPENIA (HCC): Status: ACTIVE | Noted: 2022-07-27

## 2022-09-12 PROBLEM — F03.90 DEMENTIA (HCC): Status: ACTIVE | Noted: 2022-09-12

## 2022-09-12 LAB
ABSOLUTE EOS #: 0.38 K/UL (ref 0–0.4)
ABSOLUTE IMMATURE GRANULOCYTE: 0.19 K/UL (ref 0–0.3)
ABSOLUTE LYMPH #: 0.67 K/UL (ref 1–4.8)
ABSOLUTE MONO #: 0.06 K/UL (ref 0.1–0.8)
BASOPHILS # BLD: 0 % (ref 0–2)
BASOPHILS ABSOLUTE: 0 K/UL (ref 0–0.2)
BLASTS ABSOLUTE COUNT: 0.03 K/UL
BLASTS: 1 %
EOSINOPHILS RELATIVE PERCENT: 12 % (ref 1–4)
HCT VFR BLD CALC: 21.6 % (ref 36–46)
HCT VFR BLD CALC: 26.6 % (ref 36–46)
HEMOGLOBIN: 7.1 G/DL (ref 12–16)
HEMOGLOBIN: 9 G/DL (ref 12–16)
IMMATURE GRANULOCYTES: 6 %
LYMPHOCYTES # BLD: 21 % (ref 24–44)
MCH RBC QN AUTO: 30.3 PG (ref 26–34)
MCHC RBC AUTO-ENTMCNC: 32.7 G/DL (ref 31–37)
MCV RBC AUTO: 92.7 FL (ref 80–100)
MONOCYTES # BLD: 2 % (ref 1–7)
MORPHOLOGY: ABNORMAL
PDW BLD-RTO: 19.4 % (ref 12.5–15.4)
PLATELET # BLD: 78 K/UL (ref 140–450)
PMV BLD AUTO: 12 FL (ref 6–12)
PROCALCITONIN: 0.9 NG/ML
RBC # BLD: 2.33 M/UL (ref 4–5.2)
SEG NEUTROPHILS: 58 % (ref 36–66)
SEGMENTED NEUTROPHILS ABSOLUTE COUNT: 1.87 K/UL (ref 1.8–7.7)
WBC # BLD: 3.2 K/UL (ref 3.5–11)

## 2022-09-12 PROCEDURE — 87040 BLOOD CULTURE FOR BACTERIA: CPT

## 2022-09-12 PROCEDURE — 6360000002 HC RX W HCPCS: Performed by: EMERGENCY MEDICINE

## 2022-09-12 PROCEDURE — 6360000002 HC RX W HCPCS

## 2022-09-12 PROCEDURE — 1210000000 HC MED SURG R&B

## 2022-09-12 PROCEDURE — G0378 HOSPITAL OBSERVATION PER HR: HCPCS

## 2022-09-12 PROCEDURE — 6370000000 HC RX 637 (ALT 250 FOR IP): Performed by: EMERGENCY MEDICINE

## 2022-09-12 PROCEDURE — 85014 HEMATOCRIT: CPT

## 2022-09-12 PROCEDURE — 36430 TRANSFUSION BLD/BLD COMPNT: CPT

## 2022-09-12 PROCEDURE — 2580000003 HC RX 258: Performed by: EMERGENCY MEDICINE

## 2022-09-12 PROCEDURE — 99222 1ST HOSP IP/OBS MODERATE 55: CPT | Performed by: INTERNAL MEDICINE

## 2022-09-12 PROCEDURE — 36415 COLL VENOUS BLD VENIPUNCTURE: CPT

## 2022-09-12 PROCEDURE — 85018 HEMOGLOBIN: CPT

## 2022-09-12 PROCEDURE — 2700000000 HC OXYGEN THERAPY PER DAY

## 2022-09-12 PROCEDURE — 87641 MR-STAPH DNA AMP PROBE: CPT

## 2022-09-12 PROCEDURE — 96365 THER/PROPH/DIAG IV INF INIT: CPT

## 2022-09-12 PROCEDURE — 6370000000 HC RX 637 (ALT 250 FOR IP): Performed by: NURSE PRACTITIONER

## 2022-09-12 PROCEDURE — 6370000000 HC RX 637 (ALT 250 FOR IP): Performed by: STUDENT IN AN ORGANIZED HEALTH CARE EDUCATION/TRAINING PROGRAM

## 2022-09-12 PROCEDURE — P9016 RBC LEUKOCYTES REDUCED: HCPCS

## 2022-09-12 PROCEDURE — 86900 BLOOD TYPING SEROLOGIC ABO: CPT

## 2022-09-12 PROCEDURE — 2580000003 HC RX 258: Performed by: NURSE PRACTITIONER

## 2022-09-12 PROCEDURE — 99222 1ST HOSP IP/OBS MODERATE 55: CPT | Performed by: STUDENT IN AN ORGANIZED HEALTH CARE EDUCATION/TRAINING PROGRAM

## 2022-09-12 RX ORDER — ALPRAZOLAM 0.25 MG/1
0.25 TABLET ORAL DAILY
Status: DISCONTINUED | OUTPATIENT
Start: 2022-09-12 | End: 2022-09-13

## 2022-09-12 RX ORDER — TIMOLOL MALEATE 5 MG/ML
1 SOLUTION/ DROPS OPHTHALMIC DAILY
Status: DISCONTINUED | OUTPATIENT
Start: 2022-09-12 | End: 2022-09-13 | Stop reason: HOSPADM

## 2022-09-12 RX ORDER — AMOXICILLIN AND CLAVULANATE POTASSIUM 875; 125 MG/1; MG/1
1 TABLET, FILM COATED ORAL 2 TIMES DAILY
Qty: 28 TABLET | Refills: 0 | Status: SHIPPED
Start: 2022-09-12 | End: 2022-09-12 | Stop reason: HOSPADM

## 2022-09-12 RX ORDER — ALPRAZOLAM 0.25 MG/1
0.25 TABLET ORAL 3 TIMES DAILY PRN
Status: DISCONTINUED | OUTPATIENT
Start: 2022-09-12 | End: 2022-09-13 | Stop reason: HOSPADM

## 2022-09-12 RX ORDER — ACETAMINOPHEN 325 MG/1
650 TABLET ORAL EVERY 6 HOURS PRN
Status: DISCONTINUED | OUTPATIENT
Start: 2022-09-12 | End: 2022-09-13 | Stop reason: HOSPADM

## 2022-09-12 RX ORDER — POTASSIUM CHLORIDE 20 MEQ/1
40 TABLET, EXTENDED RELEASE ORAL PRN
Status: DISCONTINUED | OUTPATIENT
Start: 2022-09-12 | End: 2022-09-13 | Stop reason: HOSPADM

## 2022-09-12 RX ORDER — AZITHROMYCIN 500 MG/1
INJECTION, POWDER, LYOPHILIZED, FOR SOLUTION INTRAVENOUS
Status: COMPLETED
Start: 2022-09-12 | End: 2022-09-12

## 2022-09-12 RX ORDER — CHOLECALCIFEROL (VITAMIN D3) 125 MCG
5 CAPSULE ORAL ONCE
Status: DISCONTINUED | OUTPATIENT
Start: 2022-09-12 | End: 2022-09-13 | Stop reason: HOSPADM

## 2022-09-12 RX ORDER — ACETAMINOPHEN 650 MG/1
650 SUPPOSITORY RECTAL EVERY 6 HOURS PRN
Status: DISCONTINUED | OUTPATIENT
Start: 2022-09-12 | End: 2022-09-13 | Stop reason: HOSPADM

## 2022-09-12 RX ORDER — DULOXETIN HYDROCHLORIDE 20 MG/1
60 CAPSULE, DELAYED RELEASE ORAL DAILY
Status: DISCONTINUED | OUTPATIENT
Start: 2022-09-12 | End: 2022-09-13 | Stop reason: HOSPADM

## 2022-09-12 RX ORDER — ONDANSETRON 4 MG/1
4 TABLET, ORALLY DISINTEGRATING ORAL EVERY 8 HOURS PRN
Status: DISCONTINUED | OUTPATIENT
Start: 2022-09-12 | End: 2022-09-13 | Stop reason: HOSPADM

## 2022-09-12 RX ORDER — SODIUM CHLORIDE 0.9 % (FLUSH) 0.9 %
5-40 SYRINGE (ML) INJECTION EVERY 12 HOURS SCHEDULED
Status: DISCONTINUED | OUTPATIENT
Start: 2022-09-12 | End: 2022-09-13 | Stop reason: HOSPADM

## 2022-09-12 RX ORDER — ALPRAZOLAM 0.25 MG/1
0.25 TABLET ORAL 2 TIMES DAILY
COMMUNITY

## 2022-09-12 RX ORDER — SODIUM CHLORIDE 9 MG/ML
INJECTION, SOLUTION INTRAVENOUS PRN
Status: DISCONTINUED | OUTPATIENT
Start: 2022-09-12 | End: 2022-09-13 | Stop reason: HOSPADM

## 2022-09-12 RX ORDER — ONDANSETRON 2 MG/ML
4 INJECTION INTRAMUSCULAR; INTRAVENOUS EVERY 6 HOURS PRN
Status: DISCONTINUED | OUTPATIENT
Start: 2022-09-12 | End: 2022-09-13 | Stop reason: HOSPADM

## 2022-09-12 RX ORDER — CHOLECALCIFEROL (VITAMIN D3) 125 MCG
CAPSULE ORAL
Status: DISPENSED
Start: 2022-09-12 | End: 2022-09-12

## 2022-09-12 RX ORDER — DOXYCYCLINE HYCLATE 100 MG
100 TABLET ORAL 2 TIMES DAILY
Qty: 20 TABLET | Refills: 0 | Status: SHIPPED
Start: 2022-09-12 | End: 2022-09-12 | Stop reason: HOSPADM

## 2022-09-12 RX ORDER — METHYLPHENIDATE HYDROCHLORIDE 5 MG/1
5 TABLET ORAL SEE ADMIN INSTRUCTIONS
Status: DISCONTINUED | OUTPATIENT
Start: 2022-09-12 | End: 2022-09-13 | Stop reason: HOSPADM

## 2022-09-12 RX ORDER — LANOLIN ALCOHOL/MO/W.PET/CERES
5 CREAM (GRAM) TOPICAL NIGHTLY
COMMUNITY

## 2022-09-12 RX ORDER — POTASSIUM CHLORIDE 7.45 MG/ML
10 INJECTION INTRAVENOUS PRN
Status: DISCONTINUED | OUTPATIENT
Start: 2022-09-12 | End: 2022-09-13 | Stop reason: HOSPADM

## 2022-09-12 RX ORDER — DORZOLAMIDE HCL 20 MG/ML
1 SOLUTION/ DROPS OPHTHALMIC 2 TIMES DAILY
Status: DISCONTINUED | OUTPATIENT
Start: 2022-09-12 | End: 2022-09-13 | Stop reason: HOSPADM

## 2022-09-12 RX ORDER — ALPRAZOLAM 0.25 MG/1
0.25 TABLET ORAL ONCE
Status: COMPLETED | OUTPATIENT
Start: 2022-09-12 | End: 2022-09-12

## 2022-09-12 RX ORDER — SODIUM CHLORIDE 0.9 % (FLUSH) 0.9 %
10 SYRINGE (ML) INJECTION PRN
Status: DISCONTINUED | OUTPATIENT
Start: 2022-09-12 | End: 2022-09-13 | Stop reason: HOSPADM

## 2022-09-12 RX ORDER — POLYETHYLENE GLYCOL 3350 17 G/17G
17 POWDER, FOR SOLUTION ORAL DAILY PRN
Status: DISCONTINUED | OUTPATIENT
Start: 2022-09-12 | End: 2022-09-13 | Stop reason: HOSPADM

## 2022-09-12 RX ORDER — OXYBUTYNIN CHLORIDE 5 MG/1
2.5 TABLET ORAL 2 TIMES DAILY
COMMUNITY

## 2022-09-12 RX ORDER — CHOLECALCIFEROL (VITAMIN D3) 125 MCG
10 CAPSULE ORAL NIGHTLY PRN
Status: DISCONTINUED | OUTPATIENT
Start: 2022-09-12 | End: 2022-09-13 | Stop reason: HOSPADM

## 2022-09-12 RX ORDER — ONDANSETRON 4 MG/1
4 TABLET, FILM COATED ORAL EVERY 6 HOURS PRN
COMMUNITY

## 2022-09-12 RX ORDER — ONDANSETRON 4 MG/1
4 TABLET, FILM COATED ORAL EVERY 8 HOURS PRN
Status: ON HOLD | COMMUNITY
End: 2022-09-12 | Stop reason: HOSPADM

## 2022-09-12 RX ORDER — ALPRAZOLAM 0.25 MG/1
0.25 TABLET ORAL 3 TIMES DAILY PRN
COMMUNITY

## 2022-09-12 RX ORDER — MAGNESIUM SULFATE 1 G/100ML
1000 INJECTION INTRAVENOUS PRN
Status: DISCONTINUED | OUTPATIENT
Start: 2022-09-12 | End: 2022-09-13 | Stop reason: HOSPADM

## 2022-09-12 RX ORDER — AZITHROMYCIN 500 MG/1
500 TABLET, FILM COATED ORAL DAILY
Qty: 14 TABLET | Refills: 0 | Status: SHIPPED
Start: 2022-09-12 | End: 2022-09-12 | Stop reason: HOSPADM

## 2022-09-12 RX ORDER — AMOXICILLIN AND CLAVULANATE POTASSIUM 875; 125 MG/1; MG/1
1 TABLET, FILM COATED ORAL 2 TIMES DAILY
Qty: 20 TABLET | Refills: 0 | Status: SHIPPED | OUTPATIENT
Start: 2022-09-12 | End: 2022-09-12 | Stop reason: SDUPTHER

## 2022-09-12 RX ADMIN — ALPRAZOLAM 0.25 MG: 0.25 TABLET ORAL at 10:57

## 2022-09-12 RX ADMIN — ALPRAZOLAM 0.25 MG: 0.25 TABLET ORAL at 01:16

## 2022-09-12 RX ADMIN — TIMOLOL MALEATE 1 DROP: 5 SOLUTION/ DROPS OPHTHALMIC at 10:58

## 2022-09-12 RX ADMIN — SODIUM CHLORIDE, PRESERVATIVE FREE 10 ML: 5 INJECTION INTRAVENOUS at 10:58

## 2022-09-12 RX ADMIN — ALPRAZOLAM 0.25 MG: 0.25 TABLET ORAL at 17:26

## 2022-09-12 RX ADMIN — DORZOLAMIDE HYDROCHLORIDE 1 DROP: 20 SOLUTION/ DROPS OPHTHALMIC at 21:18

## 2022-09-12 RX ADMIN — CEFTRIAXONE SODIUM 1000 MG: 1 INJECTION, POWDER, FOR SOLUTION INTRAMUSCULAR; INTRAVENOUS at 04:43

## 2022-09-12 RX ADMIN — DORZOLAMIDE HYDROCHLORIDE 1 DROP: 20 SOLUTION/ DROPS OPHTHALMIC at 10:58

## 2022-09-12 RX ADMIN — AZITHROMYCIN MONOHYDRATE 500 MG: 500 INJECTION, POWDER, LYOPHILIZED, FOR SOLUTION INTRAVENOUS at 02:49

## 2022-09-12 RX ADMIN — DULOXETINE 60 MG: 20 CAPSULE, DELAYED RELEASE ORAL at 10:57

## 2022-09-12 ASSESSMENT — PAIN SCALES - GENERAL: PAINLEVEL_OUTOF10: 0

## 2022-09-12 NOTE — ED NOTES
Late entry     Pt back to room post CT upon entry to pt room RN noted pt to have extensive amount of bleeding noted to hair, towel , draw sheet and chux. Upon removal of towel Laceration noted to be actively bleeding. Pulsation noted with bleed. Pressure immediately applied to head. Vitals obtained. LOC baseline with evaluation and Family at bedside . Family applies pressure for RN to exit room. RN notified E. Aracelis PA . MARY BETH Azevedop to bedside for evaluation. PA sutures bedside. Pt hair washed at bedside. Pt linens changed new gown applied.       Daksha Kang RN  09/11/22 7810       Daksha Kang RN  09/11/22 [address] [facility], RN  09/11/22 4236

## 2022-09-12 NOTE — H&P
hemoglobin of 6.4. Chest x-ray was done and was remarkable for a right perihilar consolidative infiltrate suggestive of pneumonia. The family states that the patient was recently diagnosed with MAC pneumonia and state that they would like this treated. The patient is a DNR-CC and is under Hospice care at home. The patient's daughter states that the patient herself does not want to be admitted to the hospital but family has requested admission for blood transfusion and antibiotics. This physician explained to the patient's daughter that there is no indication for hospital admission for pneumonia as the patient is not symptomatic and has not yet tried outpatient antibiotics. The patient's daughter states that her mother was recently diagnosed with MAC pneumonia and appears to be very concerned about this (states that she believes infection has contributed more to the patient's current state of health than leukemia). The patient did not wish to be examined at bedside this morning. Plan to transfuse 1 unit PRBC for anemia with anticipated discharge home with Hospice later today. This physician addressed goals of care with family at bedside. I explained that it is unusual for patients under Hospice care to be admitted to the hospital as this does not contribute to patient comfort and specifically asked family if they wished to revoke Hospice care. The family would like to continue comfort care at this time. Interval update: The patient will need to be admitted for IV antibiotics and blood transfusion per Hospice attending physician (Dr. Amairani Michael). Plan to consult infectious disease per family's request. Will also consult pulmonology for further evaluation of right pleural effusion. The patient may benefit from therapeutic/diagnostic thoracentesis if the family chooses to continue aggressive care.      Past Medical History:     Past Medical History:   Diagnosis Date    Anxiety     Basal cell carcinoma of skin of lower limb, including hip 10/22/2014    Basal cell carcinoma of skin of other and unspecified parts of face 10/22/2014    Cerebral artery occlusion with cerebral infarction [facility]) 2017    Colitis     Diarrhea     malabsorption    Glaucoma         Past Surgical History:     Past Surgical History:   Procedure Laterality Date    ANKLE SURGERY      APPENDECTOMY      COLONOSCOPY      CYST REMOVAL Left 1994    wrist    MANDIBLE SURGERY      correction of overbite    PRE-MALIGNANT / BENIGN SKIN LESION EXCISION  03/30/2022    SKIN BIOPSY  1992    upper back--nodular subepidermal fibroma, back, neck--epidermal inclusion cyst    SKIN BIOPSY  2009    nose--bcc    SKIN BIOPSY  2012    rt shin--bcc, rt cheek--epidermoid cyst    SKIN BIOPSY N/A 3/30/2022    BACK LESION BIOPSY EXCISION performed by Carmelina Lawler MD at [address] [facility],[address]          Medications Prior to Admission:     Prior to Admission medications    Medication Sig Start Date End Date Taking? Authorizing Provider   amoxicillin-clavulanate (AUGMENTIN) 875-125 MG per tablet Take 1 tablet by mouth 2 times daily for 14 days 9/12/22 9/26/22 Yes Jordan Hubbard MD   [facility]) 500 MG tablet Take 1 tablet by mouth daily for 14 days 9/12/22 9/26/22 Yes Jordan Hubbard MD   ALPRAZolam Alice Sear) 0.25 MG tablet Take by mouth. 8/28/22  Yes Historical Provider, MD   melatonin 5 MG TABS tablet Take 10 mg by mouth 8/29/22 9/12/22  Historical Provider, MD   dorzolamide (TRUSOPT) 2 % ophthalmic solution dorzolamide 2 % eye drops   INSTILL 1 DROP INTO BOTH EYES TWICE A DAY    Historical Provider, MD   methylphenidate (RITALIN) 5 MG tablet Take 5 mg by mouth See Admin Instructions. 5 mg in morning, 2.5 mg in evening. 10/20/14   Historical Provider, MD   timolol (TIMOPTIC) 0.5 % ophthalmic solution Place 1 drop into both eyes daily. 10/15/14   Historical Provider, MD   lamoTRIgine (LAMICTAL) 150 MG tablet Take 150 mg by mouth daily.   Patient not taking: Reported on 2022 10/22/14 9/12/22  Historical Provider, MD   APRISO 0.375 G capsule Take 1.5 g by mouth daily. Patient not taking: Reported on 2022 10/20/14 9/12/22  Historical Provider, MD   sertraline (ZOLOFT) 25 MG tablet Take 12.5 mg by mouth daily. Patient not taking: No sig reported 10/22/14 9/12/22  Historical Provider, MD   calcium carbonate (OSCAL) 500 MG TABS tablet Take 500 mg by mouth daily. Patient not taking: Reported on 2022  Historical Provider, MD   Vitamin D (CHOLECALCIFEROL) 1000 UNITS CAPS capsule Take 3,000 Units by mouth daily. Patient not taking: Reported on 2022  Historical Provider, MD   DULoxetine (CYMBALTA) 60 MG capsule Take 60 mg by mouth daily. Historical Provider, MD   Ascorbic Acid (VITAMIN C) 500 MG tablet Take 500 mg by mouth daily. Patient not taking: Reported on 2022  Historical Provider, MD   Multiple Vitamins-Minerals (DAILY MULTIPLE VITAMINS/MIN PO) Take 1 tablet by mouth daily. Patient not taking: Reported on 2022  Historical Provider, MD        Allergies:     Latex, Ciprofloxacin, and Ketorolac    Social History:     Tobacco:    reports that she has never smoked. She has never used smokeless tobacco.  Alcohol:      reports no history of alcohol use. Drug Use:  reports no history of drug use. Family History:     History reviewed. No pertinent family history. Review of Systems:     Unable to obtain ROS due to baseline dementia. Physical Exam:   BP (!) 123/59   Pulse 99   Temp 97.8 °F (36.6 °C) (Tympanic)   Resp 18   Ht 5' 2\" (1.575 m)   Wt 120 lb (54.4 kg)   SpO2 90%   BMI 21.95 kg/m²   Temp (24hrs), Av.8 °F (36.6 °C), Min:97.8 °F (36.6 °C), Max:97.8 °F (36.6 °C)    No results for input(s): POCGLU in the last 72 hours. No intake or output data in the 24 hours ending 22 0743    General Appearance:  Frail appearing.    Mental status: oriented to person   Head:  Laceration appreciated on posterior scalp. Eye: no icterus, redness, pupils equal and reactive, extraocular eye movements intact, conjunctiva clear  Ear: normal external ear, no discharge, hearing intact  Nose:  no drainage noted  Mouth: mucous membranes moist  Neck: supple, no carotid bruits, thyroid not palpable  Lungs: Bilateral equal air entry, clear to ausculation, no wheezing, rales or rhonchi, normal effort  Cardiovascular: normal rate, regular rhythm, no murmur, gallop, rub  Abdomen: Soft, nontender, nondistended, normal bowel sounds, no hepatomegaly or splenomegaly  Neurologic: There are no new focal motor or sensory deficits, normal muscle tone and bulk, no abnormal sensation, normal speech, cranial nerves II through XII grossly intact  Skin: Posterior scalp wound appreciated.    Extremities:  peripheral pulses palpable, no pedal edema or calf pain with palpation  Psych: normal affect     Investigations:      Laboratory Testing:  Recent Results (from the past 24 hour(s))   CBC with Auto Differential    Collection Time: 09/11/22  7:16 PM   Result Value Ref Range    WBC 3.2 (L) 3.5 - 11.0 k/uL    RBC 2.33 (L) 4.0 - 5.2 m/uL    Hemoglobin 7.1 (L) 12.0 - 16.0 g/dL    Hematocrit 21.6 (L) 36 - 46 %    MCV 92.7 80 - 100 fL    MCH 30.3 26 - 34 pg    MCHC 32.7 31 - 37 g/dL    RDW 19.4 (H) 12.5 - 15.4 %    Platelets 78 (L) 529 - 450 k/uL    MPV 12.0 6.0 - 12.0 fL    Seg Neutrophils PENDING %    Lymphocytes PENDING %    Monocytes PENDING %    Eosinophils % PENDING %    Basophils PENDING %    Segs Absolute PENDING k/uL    Absolute Lymph # PENDING k/uL    Absolute Mono # PENDING k/uL    Absolute Eos # PENDING k/uL    Basophils Absolute PENDING 0.0 - 0.2 k/uL   Basic Metabolic Panel    Collection Time: 09/11/22  7:16 PM   Result Value Ref Range    Glucose 118 (H) 70 - 99 mg/dL    BUN 30 (H) 8 - 23 mg/dL    Creatinine 0.59 0.50 - 0.90 mg/dL    Calcium 8.4 (L) 8.6 - 10.4 mg/dL    Sodium 130 (L) 135 - 144 mmol/L    Potassium 4.2 3.7 - 5.3 mmol/L    Chloride 94 (L) 98 - 107 mmol/L    CO2 27 20 - 31 mmol/L    Anion Gap 9 9 - 17 mmol/L    GFR Non-African American >60 >60 mL/min    GFR African American >60 >60 mL/min    GFR Comment         Hemoglobin and Hematocrit    Collection Time: 09/11/22  9:01 PM   Result Value Ref Range    Hemoglobin 6.4 (LL) 12.0 - 16.0 g/dL    Hematocrit 19.4 (L) 36 - 46 %   TYPE AND SCREEN    Collection Time: 09/11/22 10:04 PM   Result Value Ref Range    Expiration Date 09/14/2022,2359     Arm Band Number [id number]     ABO/Rh B POSITIVE     Antibody Screen POSITIVE     Antibody ID Anti-Vasquez(A) Present     BONI IgG POSITIVE     Unit Number [id number]     Product Code Leukocyte Reduced Red Cell     Unit Divison 00     Dispense Status RETURNED/TRANSFERRED     Transfusion Status OK TO TRANSFUSE     Crossmatch Result COMPATIBLE     Unit Number [id number]     Product Code Leukocyte Reduced Red Cell     Unit Divison 00     Dispense Status RETURNED/TRANSFERRED     Transfusion Status OK TO TRANSFUSE     Crossmatch Result COMPATIBLE        Imaging/Diagnostics:  XR CHEST (2 VW)    Result Date: 9/11/2022  Right perihilar consolidative infiltrate. Fairly diffuse interstitial infiltrate throughout the remaining right lung suggesting pneumonia. Moderate right pleural effusion. RECOMMENDATION: Recommend follow-up exams until the infiltrate clears     CT HEAD WO CONTRAST    Result Date: 9/11/2022  Mild central and cortical cerebral atrophy. Moderate chronic deep white matter ischemic changes No acute intracranial abnormalities are noted. Left parietal scalp hematoma     CT CERVICAL SPINE WO CONTRAST    Result Date: 9/11/2022  1. No acute abnormality of the cervical spine. 2. Diffuse degenerative changes of mild-to-moderate severity. 3. Acquired cervical spinal canal stenosis mild-to-moderate severity, most severe at C6-7 at 6 mm AP dimension. MRI correlation may be indicated patient develops cord related or radicular-type symptoms. According gross may cannot be excluded. 4. Partial visualization of large volume right pleural effusion. Assessment :      Hospital Problems             Last Modified POA    * (Principal) Acute blood loss anemia 9/12/2022 Yes    Acute myeloblastic leukemia, not having achieved remission ([address] 9/12/2022 Yes    Anxiety disorder 9/12/2022 Yes    Dementia ([facility] [address] 9/12/2022 Yes    Pancytopenia ([facility] [address] 9/12/2022 Yes    Pneumonia due to infectious organism 9/12/2022 Yes    Anemia 9/12/2022 Yes       Plan:     Patient status observation in the Med/Surge    Anemia   -Secondary to AML   -Transfuse 1 unit PRBC   -Daily CBC    Pneumonia   -CXR showing RLL pneumonia   -Per family, the patient was recently diagnosed with MAC and they would like an infectious disease consult   -Start ceftriaxone and azithromycin   -Procalcitonin pending   -Strep pneumoniae antigen pending   -Legionella antigen pending   -MRSA nasal probe pending   -Sputum culture   -Infectious disease has been consulted per family's request   -Daily CBC  -Daily BMP     Pleural effusion   -CXR showing a moderate right pleural effusion   -Will consult pulmonology to assess need for thoracentesis     AML   -Will consult oncology should family choose to revoke Hospice     Disposition: The on-call Hospice physician was contacted and agrees with admission for blood transfusion and IV antibiotics. Anticipate discharge once treatment plan for MAC has been finalized.      Consultations:   None      Jordan Hubbard MD  9/12/2022  7:43 AM    Copy sent to Dr. Eliazar Bumpers, MD

## 2022-09-12 NOTE — DISCHARGE INSTR - COC
Continuity of Care Form    Patient Name: Diony Malcolm   :  1940  MRN:  [id number]    Admit date:  2022  Discharge date:  ***    Code Status Order: [facility]   Advance Directives:     Admitting Physician:  No admitting provider for patient encounter. PCP: Latosha Betancourt MD    Discharging Nurse: [facility] Unit/Room#: [address]  Discharging Unit Phone Number: ***    Emergency Contact:   Extended Emergency Contact Information  Primary Emergency Contact: isha [facility] Phone: [phone]  Relation: Child  Secondary Emergency Contact: [address] [place] [address] Phone: [phone]  Relation: Child    Past Surgical History:  Past Surgical History:   Procedure Laterality Date    ANKLE SURGERY      APPENDECTOMY      COLONOSCOPY      CYST REMOVAL Left 1994    wrist    MANDIBLE SURGERY      correction of overbite    PRE-MALIGNANT / BENIGN SKIN LESION EXCISION  2022    SKIN BIOPSY      upper back--nodular subepidermal fibroma, back, neck--epidermal inclusion cyst    SKIN BIOPSY      nose--bcc    SKIN BIOPSY      rt shin--bcc, rt cheek--epidermoid cyst    SKIN BIOPSY N/A 3/30/2022    BACK LESION BIOPSY EXCISION performed by Johan Estrella MD at [address] [facility],[address]         Immunization History: There is no immunization history on file for this patient.     Active Problems:  Patient Active Problem List   Diagnosis Code    Basal cell carcinoma of skin of other parts of face C44.319    Basal cell carcinoma of skin of lower limb, including hip C44.711    Basal cell carcinoma of back C44.519    Acute blood loss anemia D62    Acute myeloblastic leukemia, not having achieved remission ([facility]) C92.00    Anxiety disorder F41.9    Dementia ([facility]) F03.90    Pancytopenia ([facility]) D61.818    Pneumonia due to infectious organism J18.9       Isolation/Infection:   Isolation            No Isolation          Patient Infection Status       None to display            Nurse Assessment:  Last Vital Signs: BP (!) 123/49   Pulse 99   Temp 97.8 °F (36.6 °C) (Tympanic)   Resp 18   Ht 5' 2\" (1.575 m)   Wt 120 lb (54.4 kg)   SpO2 90%   BMI 21.95 kg/m²     Last documented pain score (0-10 scale):    Last Weight:   Wt Readings from Last 1 Encounters:   22 120 lb (54.4 kg)     Mental Status:  {IP PT MENTAL STATUS:06278}    IV Access:  { LUIS ANTONIO IV ACCESS:703183280}    Nursing Mobility/ADLs:  Walking   {CHP DME ETAU:986180532}  Transfer  {CHP DME HWIP:089821924}  Bathing  {CHP DME FJYW:154769557}  Dressing  {CHP DME DVIE:587572638}  Toileting  {CHP DME BZZJ:506813080}  Feeding  {CHP DME UPHK:150058212}  Med Admin  {P DME EILV:938574555}  Med Delivery   { LUIS ANTONIO MED Delivery:591332866}    Wound Care Documentation and Therapy:  Incision 22 Back Medial;Upper (Active)   Number of days: 166        Elimination:  Continence: Bowel: {YES / QJ:72492}  Bladder: {YES / UJ:86848}  Urinary Catheter: {Urinary Catheter:650208907}   Colostomy/Ileostomy/Ileal Conduit: {YES / JU:34122}       Date of Last BM: ***  No intake or output data in the 24 hours ending 22 0649  No intake/output data recorded.     Safety Concerns:     [employer] Safety Concerns:119355938}    Impairments/Disabilities:      [employer] Impairments/Disabilities:828542400}    Nutrition Therapy:  Current Nutrition Therapy:   [employer] Diet List:625473201}    Routes of Feeding: {P DME Other Feedings:345392389}  Liquids: {Slp liquid thickness:41278}  Daily Fluid Restriction: {CHP DME Yes amt example:333222473}  Last Modified Barium Swallow with Video (Video Swallowing Test): {Done Not Done GI:003258315}    Treatments at the Time of Hospital Discharge:   Respiratory Treatments: ***  Oxygen Therapy:  {Therapy; copd oxygen:28128}  Ventilator:    { GEO Vent JQDX:678703917}    Rehab Therapies: {THERAPEUTIC INTERVENTION:1926031731}  Weight Bearing Status/Restrictions: [address] GU[address] Weight Bearin}  Other Medical Equipment (for information only, NOT a DME order):  {EQUIPMENT:435964077}  Other Treatments: ***    Patient's personal belongings (please select all that are sent with patient):  {CHP DME Belongings:765023010}    RN SIGNATURE:  {Esignature:475872973}    CASE MANAGEMENT/SOCIAL WORK SECTION    Inpatient Status Date: ***    Readmission Risk Assessment Score:  Readmission Risk              Risk of Unplanned Readmission:  14           Discharging to Facility/ Agency   Name:   Address:  Phone:  Fax:    Dialysis Facility (if applicable)   Name:  Address:  Dialysis Schedule:  Phone:  Fax:    / signature: {Esignature:052779042}    PHYSICIAN SECTION    Prognosis: Guarded    Condition at Discharge: Stable    Rehab Potential (if transferring to Rehab): Guarded    Recommended Labs or Other Treatments After Discharge: Take Augmentin as prescribed. Follow-up with your primary care physician in two-weeks to ensure resolution of pneumonia. Physician Certification: I certify the above information and transfer of Tre Jaeger  is necessary for the continuing treatment of the diagnosis listed and that she requires Hospice for greater 30 days.      Update Admission H&P: No change in H&P    PHYSICIAN SIGNATURE:  Electronically signed by Pb Varela MD on 9/12/22 at 6:50 AM EDT

## 2022-09-12 NOTE — CONSULTS
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 We do not have any records available but she has back isolated from lymph nodes in her neck raising concern for disseminated infection  I did discuss theoretical treatment with the daughter and I did feel that it would be appropriate to start her on medical treatment which would be at least 3 oral medications with her treatment plan of 18 to 24 months  The treatment regimen is not typically easy to tolerate and given that there is no plan to treat the AML I do not at all feel that it makes any sense to treat the MAC  If treatment is being considered truly then the patient will need CT imaging of the chest, abdomen and pelvis if this was not done at [facility]  The plan is to try to obtain the records from [facility]  At this point in time the daughter is not at all interested in even draining the pleural effusion which again is another indicator that trying to put her on medical treatment for the Mycobacterium avium complex would not make sense given she is declining other therapies. Chief complaint/reason for consultation:   MAC infection    History of Present Illness:   Jessie Mohs is a 79y.o.-year-old female who was initially admitted on 9/11/2022.    Denilson Garcia has a history of basal cell skin cancer, and was recently diagnosed with AML within the last few months for which I understand from the daughter that they are not seeking any aggressive therapy. 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 The daughter reports being made aware of this diagnosis a few weeks ago and she wanted to see if this is something that needed to be addressed. Olimpia Waggoner did fall at home and sustained a scalp wound laceration with significant bleeding. There was no reported loss of consciousness and due to persistent bleeding the patient was brought into the emergency department where she was noted to have a large left scalp hematoma. The patient was noted to be anemic with a hemoglobin of 6.4 and the family requested admission for blood transfusion. A chest x-ray was done that showed a right perihilar consolidative infiltrate suggestive of pneumonia and antibiotics were also started for the pneumonia. I was asked to evaluate and discuss back treatment. The patient apparently has had months of intermittent fevers, chills, generalized malaise, loss of appetite, weight loss, and some respiratory symptoms as well. The patient did not cough at all while I was in the room and was saturating well on room air. 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 I have personally reviewed the past medical history, past surgical history, medications, social history, and family history, and I have updated the database accordingly.   Past Medical History:     Past Medical History:   Diagnosis Date    Anxiety     Basal cell carcinoma of skin of lower limb, including hip 10/22/2014    Basal cell carcinoma of skin of other and unspecified parts of face 10/22/2014    Cerebral artery occlusion with cerebral infarction [facility]) 2017    Colitis     Diarrhea     malabsorption    Glaucoma      Past Surgical  History:     Past Surgical History:   Procedure Laterality Date    ANKLE SURGERY      APPENDECTOMY      COLONOSCOPY      CYST REMOVAL Left 1994    wrist    MANDIBLE SURGERY      correction of overbite    PRE-MALIGNANT / BENIGN SKIN LESION EXCISION  03/30/2022    SKIN BIOPSY  1992    upper back--nodular subepidermal fibroma, back, neck--epidermal inclusion cyst    SKIN BIOPSY  2009    nose--bcc    SKIN BIOPSY  2012    rt shin--bcc, rt cheek--epidermoid cyst    SKIN BIOPSY N/A 3/30/2022    BACK LESION BIOPSY EXCISION performed by Esperanza Duenas MD at [address] [facility],[address]       Medications:      dorzolamide  1 drop Both Eyes BID    timolol  1 drop Both Eyes Daily    sodium chloride flush  5-40 mL IntraVENous 2 times per day    azithromycin  500 mg IntraVENous Once    melatonin  5 mg Oral Once    DULoxetine  60 mg Oral Daily    methylphenidate  5 mg Oral See Admin Instructions    cefTRIAXone (ROCEPHIN) IV  1,000 mg IntraVENous Q24H    azithromycin  500 mg IntraVENous Q24H     Social History:     Social History     Socioeconomic History    Marital status:      Spouse name: Not on file    Number of children: Not on file    Years of education: Not on file    Highest education level: Not on file   Occupational History    Not on file   Tobacco Use    Smoking status: Never    Smokeless tobacco: Never   Substance and Sexual Activity    Alcohol use: No    Drug use: No    Sexual activity: Not on file   Other Topics Concern    Not on file   Social History Narrative    Not on file     Social Determinants of Health     Financial Resource Strain: Not on file   Food Insecurity: Not on file   Transportation Needs: Not on file   Physical Activity: Not on file   Stress: Not on file   Social Connections: Not on file   Intimate Partner Violence: Not on file   Housing Stability: Not on file     Family History:   History reviewed. No pertinent family history.    Allergies:   Latex, Ciprofloxacin, and Ketorolac     Review of Systems:   General:  The patient has had reported fevers, chills, generalized malaise, poor appetite and weight loss  Eyes: No double vision or blurry vision. ENT: No sore throat or runny nose. Cardiovascular: No chest pain or palpitations. Lung: No shortness of breath or cough. Abdomen: No nausea, vomiting, diarrhea, or abdominal pain. Genitourinary: No increased urinary frequency, or dysuria. Musculoskeletal: No muscle aches or pains. Hematologic: No bleeding or bruising. Neurologic: No headache, weakness, numbness, or tingling. Physical Examination :   BP (!) 125/53   Pulse 84   Temp 97.7 °F (36.5 °C)   Resp 17   Ht 5' 2\" (1.575 m)   Wt 138 lb 7.2 oz (62.8 kg)   SpO2 98%   BMI 25.32 kg/m²     Temperature Range: Temp: 97.7 °F (36.5 °C) Temp  Av.9 °F (36.6 °C)  Min: 97.6 °F (36.4 °C)  Max: 98.4 °F (36.9 °C)  General Appearance: Awake, alert, and in no apparent distress  Head: Normocephalic, without obvious abnormality, atraumatic  Eyes: Pupils equal, round, reactive, to light and accommodation; extraocular movements intact; sclera anicteric; conjunctivae pink  ENT: Oropharynx clear, without erythema, exudate, or thrush. Neck: Supple, without lymphadenopathy. Pulmonary/Chest: Few scattered rales appreciated  Cardiovascular: Regular rate and rhythm without murmurs, rubs, or gallops. Abdomen: Soft, nontender, nondistended. Extremities: No cyanosis, clubbing, edema, or effusions. Neurologic: No gross sensory or motor deficits. Skin: Warm and dry with no rash. Medical Decision Making:   I have independently reviewed/ordered the following labs:  CBC with Differential:   Recent Labs     22  2101 22  19022  0502   WBC 3.2*  --   --  3.6   HGB 7.1*   < > 9.0* 8.8*   HCT 21.6*   < > 26.6* 25.6*   PLT 78*  --   --  200   LYMPHOPCT 21*  --   --  9*   MONOPCT 2  --   --  2    < > = values in this interval not displayed.      BMP:   Recent Labs     22  0502   * 130*   K 4.2 3.9   CL 94* 95*   CO2 27 28   BUN 30* 25*   CREATININE 0.59 0.68     Hepatic Function Panel: No results for input(s): PROT, LABALBU, BILIDIR, IBILI, BILITOT, ALKPHOS, ALT, AST in the last 72 hours. Lab Results   Component Value Date/Time    PROCAL 0.90 09/11/2022 10:00 PM       No results found for: CRP  No results found for: FERRITIN  No results found for: FIBRINOGEN  No results found for: DDIMER  No results found for: LDH    No results found for: SEDRATE    No results found for: COVID19  No results found for requested labs within last 30 days. Imaging Studies:   TWO XRAY VIEWS OF THE CHEST 9/11/2022 7:04 pm     FINDINGS:   Right perihilar consolidative infiltrate. Fairly diffuse interstitial infiltrate throughout the remaining right lung suggesting pneumonia. Moderate right pleural effusion. RECOMMENDATION: Recommend follow-up exams until the infiltrate clears       CT OF THE HEAD WITHOUT CONTRAST  9/11/2022 4:36 pm  FINDINGS:   Mild central and cortical cerebral atrophy. Moderate chronic deep white matter ischemic changes No acute intracranial abnormalities are noted. Left parietal scalp hematoma         CT OF THE CERVICAL SPINE WITHOUT CONTRAST 9/11/2022 7:36 pm   FINDINGS:   1. No acute abnormality of the cervical spine. 2. Diffuse degenerative changes of mild-to-moderate severity. 3. Acquired cervical spinal canal stenosis mild-to-moderate severity, most severe at C6-7 at 6 mm AP dimension. MRI correlation may be indicated patient develops cord related or radicular-type symptoms. According gross may cannot be excluded. 4. Partial visualization of large volume right pleural effusion. Cultures:     Culture, Blood 1 [[id number]] Collected: 09/12/22 1847   Order Status: Completed Specimen: Blood Updated: 09/12/22 2211    Specimen Description . BLOOD    Special Requests 5ML RIGHT ANTECUBITAL    Culture NO GROWTH <24 HRS   Culture, Blood 1 [[id number]] Collected: 09/12/22 1855   Order Status: Completed Specimen: Blood Updated: 09/12/22 2208    Specimen Description Cecille Copping BLOOD    Special Requests 3ML LEFT ANTECUBITAL    Culture NO GROWTH <24 HRS         Thank you for allowing us to participate in the care of this patient. Please call with questions. Electronically signed by Sherrill Duarte MD on 9/13/2022 at 8:48 AM      [facility]  Sherrill Duarte MD  [employer] messaging  OFFICE: [phone]      This note is created with the assistance of a speech recognition program.  While intending to generate a document that actually reflects the content of the visit, the document can still have some errors including those of syntax and sound a like substitutions which may escape proof reading. In such instances, actual meaning can be extrapolated by contextual diversion.

## 2022-09-12 NOTE — FLOWSHEET NOTE
2RN's at bedside to verify blood consent daughter notified RN to hold off on infusion to verify if family was ok with current orders. Daughter and family ok with current orders 1unit started per protocol.

## 2022-09-12 NOTE — ED PROVIDER NOTES
[id number] [facility] ED  [address] [facility] [place] [facility] [place] [address]  Phone: [phone]  Fax: Karen Brito 112      Pt Name: Earline King  MRN: [id number]  [place] 1940  Date of evaluation: 9/11/2022  Provider: Kaylyn Valerio PA-C    CHIEF COMPLAINT       Chief Complaint   Patient presents with    Head Injury           HISTORY OF PRESENT ILLNESS  (Location/Symptom, Timing/Onset, Context/Setting, Quality, Duration, Modifying Factors, Severity.)   Earline King is a 79 y.o. female who presents to the emergency department for evaluation of scalp wound laceration and significant bleeding after a fall from standing at home. Significant poorly controlled bleeding while EMS on the scene. Patient is presently living athome with family and is on hospice care due to Leukemia. Patient has a history of anemia and has required some transfusions in the past.  There is no reported loss of consciousness. Patient was down for an undetermined amount of time but sounds as though family was just outside and came in and noted that she was on the ground. No other concerns for significant trauma and no other complaints by the patient. There is been no reported significant vomiting. Patient is reportedly a full code though still being on hospice. Nursing Notes were reviewed. REVIEW OF SYSTEMS    (2-9 systems for level 4, 10 or more for level 5)     Review of Systems   Constitutional: Negative. HENT: Negative. Eyes: Negative. Respiratory: Negative. Cardiovascular: Negative. Gastrointestinal: Negative. Musculoskeletal: Negative. Endocrine: Negative. Genitourinary: Negative. Skin: Negative. Allergic/Immunologic: Negative. Neurological: Negative. Hematological: Negative. Psychiatric/Behavioral: Negative. Except as noted above the remainder of the review of systems was reviewed and negative. PAST MEDICAL HISTORY   History reviewed.     Past Medical History:   Diagnosis Date    Anxiety     Basal cell carcinoma of skin of lower limb, including hip 10/22/2014    Basal cell carcinoma of skin of other and unspecified parts of face 10/22/2014    Cerebral artery occlusion with cerebral infarction [facility]) 2017    Colitis     Diarrhea     malabsorption    Glaucoma          SURGICAL HISTORY     History reviewed. Past Surgical History:   Procedure Laterality Date    ANKLE SURGERY      APPENDECTOMY      COLONOSCOPY      CYST REMOVAL Left 1994    wrist    MANDIBLE SURGERY      correction of overbite    PRE-MALIGNANT / BENIGN SKIN LESION EXCISION  03/30/2022    SKIN BIOPSY  1992    upper back--nodular subepidermal fibroma, back, neck--epidermal inclusion cyst    SKIN BIOPSY  2009    nose--bcc    SKIN BIOPSY  2012    rt shin--bcc, rt cheek--epidermoid cyst    SKIN BIOPSY N/A 3/30/2022    BACK LESION BIOPSY EXCISION performed by Lázaro Smith MD at [address] [place] [address]       Previous Medications    APRISO 0.375 G CAPSULE    Take 1.5 g by mouth daily. ASCORBIC ACID (VITAMIN C) 500 MG TABLET    Take 500 mg by mouth daily. CALCIUM CARBONATE (OSCAL) 500 MG TABS TABLET    Take 500 mg by mouth daily. CEPHALEXIN (KEFLEX) 250 MG CAPSULE    Take one PO QID. Start day before surgery. DORZOLAMIDE (TRUSOPT) 2 % OPHTHALMIC SOLUTION    dorzolamide 2 % eye drops   INSTILL 1 DROP INTO BOTH EYES TWICE A DAY    DULOXETINE (CYMBALTA) 60 MG CAPSULE    Take 60 mg by mouth daily. LAMOTRIGINE (LAMICTAL) 150 MG TABLET    Take 150 mg by mouth daily. METHYLPHENIDATE (RITALIN) 5 MG TABLET    Take 5 mg by mouth See Admin Instructions. 5 mg in morning, 2.5 mg in evening. MULTIPLE VITAMINS-MINERALS (DAILY MULTIPLE VITAMINS/MIN PO)    Take 1 tablet by mouth daily. SERTRALINE (ZOLOFT) 25 MG TABLET    Take 12.5 mg by mouth daily. TIMOLOL (TIMOPTIC) 0.5 % OPHTHALMIC SOLUTION    Place 1 drop into both eyes daily.     VITAMIN D (CHOLECALCIFEROL) 1000 UNITS CAPS CAPSULE    Take 3,000 Units by mouth daily. ALLERGIES     Latex, Ciprofloxacin, and Ketorolac    FAMILY HISTORY     History reviewed. No pertinent family history. No family status information on file. SOCIAL HISTORY      reports that she has never smoked. She has never used smokeless tobacco. She reports that she does not drink alcohol and does not use drugs. lives at home with other     PHYSICAL EXAM    (up to 7 for level 4, 8 or more for level 5)     ED Triage Vitals [09/11/22 1829]   BP Temp Temp Source Heart Rate Resp SpO2 Height Weight   134/76 97.8 °F (36.6 °C) Tympanic 99 18 93 % 5' 2\" (1.575 m) 120 lb (54.4 kg)       Physical Exam   Nursing note and vitals reviewed. Constitutional:   Thin build. No lethargy. Nontoxic. Head: Normocephalic. Entire posterior of scalp/hair matted with coagulated blood, ~ 2cm laceration of the posterior crown of the head with underlying hematoma. No overt depression. Ear: External ears normal.   Nose: Nose normal and midline. Eyes: Conjunctivae and EOM are normal. Pupils are equal/round  Neck: Normal range of motion. No midline or paraspinal tenderness to palpation. Oral/Throat: Posterior pharynx wnl, Airway is patent  Cardiovascular: Normal rate, regular rhythm, normal heart sounds   Pulmonary/Chest: Effort normal and breath sounds normal. No wheezes/rales/rhonchi. Abdominal: Soft. Bowel sounds are normal. No distension or obvious mass/herniation. No TTP. Musculoskeletal: Normal to inspection. NV intact x 4. No signs of acute limb ischemia. Neurological: Alert, age appropriate mentation and interaction. Skin: Skin is warm and dry. No rash noted. Psychiatric: flat affect, slow mentation but interactive/verbal, at baseline per family in the room.        DIAGNOSTIC RESULTS     EKG: All EKG's are interpreted by the Emergency Department Physician who either signs or Co-signs this chart in the absence of a cardiologist.    Not indicated OR per attending note    RADIOLOGY:   Non-plain film images such as CT, Ultrasound and MRI are read by the radiologist. Plain radiographic images are visualized and preliminarily interpreted by the emergency physician with the below findings:      Interpretation per the Radiologist below, if available at the time of this note:    [address]   Final Result   Mild central and cortical cerebral atrophy. Moderate chronic deep white matter ischemic changes      No acute intracranial abnormalities are noted. Left parietal scalp hematoma         CT CERVICAL SPINE WO CONTRAST   Final Result   1. No acute abnormality of the cervical spine. 2. Diffuse degenerative changes of mild-to-moderate severity. 3. Acquired cervical spinal canal stenosis mild-to-moderate severity, most   severe at C6-7 at 6 mm AP dimension. MRI correlation may be indicated   patient develops cord related or radicular-type symptoms. According gross   may cannot be excluded. 4. Partial visualization of large volume right pleural effusion. XR CHEST (2 VW)    (Results Pending)           LABS:  Labs Reviewed   CBC WITH AUTO DIFFERENTIAL - Abnormal; Notable for the following components:       Result Value    WBC 3.2 (*)     RBC 2.33 (*)     Hemoglobin 7.1 (*)     Hematocrit 21.6 (*)     RDW 19.4 (*)     Platelets 78 (*)     All other components within normal limits   BASIC METABOLIC PANEL - Abnormal; Notable for the following components:    Glucose 118 (*)     BUN 30 (*)     Calcium 8.4 (*)     Sodium 130 (*)     Chloride 94 (*)     All other components within normal limits   HEMOGLOBIN AND HEMATOCRIT - Abnormal; Notable for the following components:    Hemoglobin 6.4 (*)     Hematocrit 19.4 (*)     All other components within normal limits   TYPE AND SCREEN         All other labs were within normal range or not returned as of this dictation.     EMERGENCY DEPARTMENT COURSE and DIFFERENTIAL DIAGNOSIS/MDM:   Vitals:    Vitals:    09/11/22 1829   BP: 134/76   Pulse: 99   Resp: 18   Temp: 97.8 °F (36.6 °C)   TempSrc: Tympanic   SpO2: 93%   Weight: 54.4 kg (120 lb)   Height: 5' 2\" (1.575 m)       1901  Significant arterial bleed from scalp, alert and at baseline per daughter in the room. Hx of anemia due to Leukemia. No other complaints by patient or daughter. Full Code but on Hospice care at home. Has required transfusions before. Family would like CBC with significant bleeding prior to arrival.      Laceration Repair Procedure Note    Indication: Laceration    Location:  posterior scalp, parietal    Procedure: The patient was placed in the appropriate position and anesthesia around the laceration was obtained by infiltration using 1% Lidocaine with epinephrine. The area around wound(s) was then cleansed with betadine, draped in a sterile fashion and irrigated copiously with normal saline/betadine dilution. The wound(s) were explored well with no foreign bodies or tendon rupture/injury identified. The laceration was closed with 4-0 Prolene using interrupted sutures. There were no additional lacerations requiring repair. The wound area was then dressed with bacitracin and a sterile dressing. Total repaired wound length: 2.5 cm. Count: Suture count: 5 (interrupted x 4, horiz fly x 1)    The patient tolerated the procedure well. Patient has good capillary refill. No signs of cyanosis or color change    Complications: Superficial arterial bleeding, superior most aspect of laceration. achieved full hemostasis after suturing. .        2130  H/H 6.4 after initial Hgb 7.1 upon arrival.  Paging Emilie for admit for transfusion. Daughter is POA she reports. Pt informed of this needs and agrees as well.     2203  Attending discussed the CT Cerv findings with Emilie/Devin. She is agreeable to admit patient.   This is apparently a new pleural effusion on the right, family confirms no known history of this previously. I have no other imaging within [facility] to compare. No neck pain by history or exam from the fall. She has been actively moving head/neck/BUE w/o pain, guarded movements or deficit during our exam and suture repair. CXR ordered to better assess this. Attending to complete all remaining care, diagnosis and disposition for this patient. We discussed this patient prior to my departure. My note will be refreshed to reflect these details, though I was not actively involved in this patient's care following the time stamp above. CONSULTS:  None    PROCEDURES:  None    Patient instructed to return to the emergency room if symptoms worsen, return, or any other concern right away which is agreed. FINAL IMPRESSION      1. Fall, initial encounter    2. Laceration of scalp, initial encounter    3. Anemia, unspecified type            DISPOSITION/PLAN   DISPOSITION Decision To Admit    CONDITION:  Stable    PATIENT REFERRED TO:  No follow-up provider specified.     DISCHARGE MEDICATIONS:  New Prescriptions    No medications on file       (Please note that portions of this note were completed with a voice recognition program.  Efforts were made to edit the dictations but occasionally words are mis-transcribed.)    HAROON Quick PA-C  09/11/22 4733

## 2022-09-12 NOTE — ED NOTES
Rn notified lab of transfusion order. Lab will notify rn of results of type and screen for transfusion. Dr. Moreno Son notified. Pt remains stable. Hospice RN at bedside.      Viral Bowen RN  09/11/22 3087

## 2022-09-12 NOTE — ED NOTES
Rn attempts multiple times for spo2 , pt non-compliant as pt removes from finger , pt resting in bed with eyes closed, daughter remains at bedside , daughter would like to hold melatonin at this time as mother is resting comfortably     Yordy Brito RN  09/12/22 1643       Yordy Brito RN  09/12/22 MEGGAN Hoskins  09/12/22 6044

## 2022-09-12 NOTE — FLOWSHEET NOTE
Second unit of RBC's completed patient tolerated well. Denies any issues. Vs obtained post H&H scheduled for 1900. Patient resting in bed with eyes closed.

## 2022-09-12 NOTE — ED NOTES
Blood consent obtained per MARY BETH TURCIOS , writer verified at bedside      Carmina Felix RN  09/12/22 3443

## 2022-09-12 NOTE — CARE COORDINATION
Transitional Planning    Notified by Dr. Shy Harrell that pt is in the ED under hospice care and family is requesting blood transfusion and ID consult. Writer contacted [facility] to define lines of boundaries for care, spoke with Rachelle Collier who consulted with supervisor, Chrissy Alfred. They will send a nurse to speak to family this morning. Updated ED staff.

## 2022-09-12 NOTE — ED NOTES
Blood consent witness and signed MARY BETH TURCIOS , writer  Present      Donald Leyden, MEGGAN  09/12/22 2901

## 2022-09-12 NOTE — PROGRESS NOTES
Occupational [address] [employer]  Occupational Therapy Not Seen Note    DATE: 2022    NAME: Anna Andrade  MRN: [id number]   : 1940      Patient not seen this date for Occupational Therapy due to:      Blood Transfusion      Next Scheduled Treatment: Will check back 2022 as able     Electronically signed by Jerrod Paredes OT on 2022 at 4:51 PM

## 2022-09-12 NOTE — PROGRESS NOTES
Physical Therapy        Physical Therapy Cancel Note      DATE: 2022    NAME: Zeb Castelan  MRN: [id number]   : 1940      Patient not seen this date for Physical Therapy due to:    Blood Transfusion: Blood transfusion in progress      Electronically signed by Concha Ambriz PT on 2022 at 1:18 PM

## 2022-09-12 NOTE — ED NOTES
rn in to initial antibiotic treatment pt refusing at this time , night medication given will reevaluate      Daksha Kang RN  09/12/22 0805

## 2022-09-12 NOTE — ED NOTES
Jh Eastman NP   Patient:   Janette Peña    YOB: 1940  MRN:   [id number]  Location: [place] [id number]   9/11/22 9:16 PM  [address] or Facility: [facility] Emergency Department [place] NEW ADMISSION From:  ed RE: Liana Shape RM: [id number] Consult for possible admission per Martina Stanton, RN  09/11/22 4088

## 2022-09-13 VITALS
BODY MASS INDEX: 25.48 KG/M2 | HEIGHT: 62 IN | WEIGHT: 138.45 LBS | SYSTOLIC BLOOD PRESSURE: 125 MMHG | DIASTOLIC BLOOD PRESSURE: 53 MMHG | RESPIRATION RATE: 17 BRPM | HEART RATE: 84 BPM | OXYGEN SATURATION: 98 % | TEMPERATURE: 97.7 F

## 2022-09-13 PROBLEM — A31.2: Status: ACTIVE | Noted: 2022-09-13

## 2022-09-13 LAB
ABO/RH: NORMAL
ABSOLUTE EOS #: 0.43 K/UL (ref 0–0.4)
ABSOLUTE LYMPH #: 0.32 K/UL (ref 1–4.8)
ABSOLUTE MONO #: 0.07 K/UL (ref 0.1–0.8)
ANION GAP SERPL CALCULATED.3IONS-SCNC: 7 MMOL/L (ref 9–17)
ANTIBODY IDENTIFICATION: NORMAL
ANTIBODY SCREEN: POSITIVE
ARM BAND NUMBER: NORMAL
BASOPHILS # BLD: 0 % (ref 0–2)
BASOPHILS ABSOLUTE: 0 K/UL (ref 0–0.2)
BLD PROD TYP BPU: NORMAL
BLD PROD TYP BPU: NORMAL
BLOOD BANK BLOOD PRODUCT EXPIRATION DATE: NORMAL
BLOOD BANK BLOOD PRODUCT EXPIRATION DATE: NORMAL
BLOOD BANK ISBT PRODUCT BLOOD TYPE: 5100
BLOOD BANK ISBT PRODUCT BLOOD TYPE: 5100
BLOOD BANK PRODUCT CODE: NORMAL
BLOOD BANK PRODUCT CODE: NORMAL
BLOOD BANK UNIT TYPE AND RH: NORMAL
BLOOD BANK UNIT TYPE AND RH: NORMAL
BPU ID: NORMAL
BPU ID: NORMAL
BUN BLDV-MCNC: 25 MG/DL (ref 8–23)
CALCIUM SERPL-MCNC: 8.3 MG/DL (ref 8.6–10.4)
CHLORIDE BLD-SCNC: 95 MMOL/L (ref 98–107)
CO2: 28 MMOL/L (ref 20–31)
CREAT SERPL-MCNC: 0.68 MG/DL (ref 0.5–0.9)
CROSSMATCH RESULT: NORMAL
CROSSMATCH RESULT: NORMAL
DAT IGG: POSITIVE
DISPENSE STATUS BLOOD BANK: NORMAL
DISPENSE STATUS BLOOD BANK: NORMAL
EOSINOPHILS RELATIVE PERCENT: 12 % (ref 1–4)
EXPIRATION DATE: NORMAL
GFR AFRICAN AMERICAN: >60 ML/MIN
GFR NON-AFRICAN AMERICAN: >60 ML/MIN
GFR SERPL CREATININE-BSD FRML MDRD: ABNORMAL ML/MIN/{1.73_M2}
GLUCOSE BLD-MCNC: 150 MG/DL (ref 70–99)
HCT VFR BLD CALC: 25.6 % (ref 36–46)
HEMOGLOBIN: 8.8 G/DL (ref 12–16)
INR BLD: 1.1
LYMPHOCYTES # BLD: 9 % (ref 24–44)
MCH RBC QN AUTO: 30.5 PG (ref 26–34)
MCHC RBC AUTO-ENTMCNC: 34.2 G/DL (ref 31–37)
MCV RBC AUTO: 89.2 FL (ref 80–100)
MONOCYTES # BLD: 2 % (ref 1–7)
MORPHOLOGY: ABNORMAL
MORPHOLOGY: ABNORMAL
MRSA, DNA, NASAL: NEGATIVE
PDW BLD-RTO: 16.3 % (ref 12.5–15.4)
PLATELET # BLD: 200 K/UL (ref 140–450)
PMV BLD AUTO: 12.2 FL (ref 6–12)
POTASSIUM SERPL-SCNC: 3.9 MMOL/L (ref 3.7–5.3)
PROTHROMBIN TIME: 11.1 SEC (ref 9.4–12.6)
RBC # BLD: 2.87 M/UL (ref 4–5.2)
SEG NEUTROPHILS: 77 % (ref 36–66)
SEGMENTED NEUTROPHILS ABSOLUTE COUNT: 2.78 K/UL (ref 1.8–7.7)
SODIUM BLD-SCNC: 130 MMOL/L (ref 135–144)
SPECIMEN DESCRIPTION: NORMAL
TRANSFUSION STATUS: NORMAL
TRANSFUSION STATUS: NORMAL
UNIT DIVISION: 0
UNIT DIVISION: 0
UNIT ISSUE DATE/TIME: NORMAL
UNIT ISSUE DATE/TIME: NORMAL
WBC # BLD: 3.6 K/UL (ref 3.5–11)

## 2022-09-13 PROCEDURE — 80048 BASIC METABOLIC PNL TOTAL CA: CPT

## 2022-09-13 PROCEDURE — 2580000003 HC RX 258: Performed by: STUDENT IN AN ORGANIZED HEALTH CARE EDUCATION/TRAINING PROGRAM

## 2022-09-13 PROCEDURE — 85610 PROTHROMBIN TIME: CPT

## 2022-09-13 PROCEDURE — 6370000000 HC RX 637 (ALT 250 FOR IP): Performed by: STUDENT IN AN ORGANIZED HEALTH CARE EDUCATION/TRAINING PROGRAM

## 2022-09-13 PROCEDURE — 85025 COMPLETE CBC W/AUTO DIFF WBC: CPT

## 2022-09-13 PROCEDURE — 6370000000 HC RX 637 (ALT 250 FOR IP): Performed by: NURSE PRACTITIONER

## 2022-09-13 PROCEDURE — 99222 1ST HOSP IP/OBS MODERATE 55: CPT | Performed by: INTERNAL MEDICINE

## 2022-09-13 PROCEDURE — 99232 SBSQ HOSP IP/OBS MODERATE 35: CPT | Performed by: HOSPITALIST

## 2022-09-13 PROCEDURE — 36415 COLL VENOUS BLD VENIPUNCTURE: CPT

## 2022-09-13 PROCEDURE — 6360000002 HC RX W HCPCS: Performed by: STUDENT IN AN ORGANIZED HEALTH CARE EDUCATION/TRAINING PROGRAM

## 2022-09-13 RX ORDER — AZITHROMYCIN 250 MG/1
TABLET, FILM COATED ORAL
Qty: 12 TABLET | Refills: 5 | Status: SHIPPED | OUTPATIENT
Start: 2022-09-13

## 2022-09-13 RX ADMIN — CEFTRIAXONE SODIUM 1000 MG: 1 INJECTION, POWDER, FOR SOLUTION INTRAMUSCULAR; INTRAVENOUS at 05:00

## 2022-09-13 RX ADMIN — ONDANSETRON 4 MG: 4 TABLET, ORALLY DISINTEGRATING ORAL at 09:20

## 2022-09-13 RX ADMIN — ALPRAZOLAM 0.25 MG: 0.25 TABLET ORAL at 12:41

## 2022-09-13 RX ADMIN — DEXTROSE MONOHYDRATE 500 MG: 50 INJECTION, SOLUTION INTRAVENOUS at 05:18

## 2022-09-13 RX ADMIN — DULOXETINE 60 MG: 20 CAPSULE, DELAYED RELEASE ORAL at 09:11

## 2022-09-13 RX ADMIN — TIMOLOL MALEATE 1 DROP: 5 SOLUTION/ DROPS OPHTHALMIC at 09:11

## 2022-09-13 RX ADMIN — DORZOLAMIDE HYDROCHLORIDE 1 DROP: 20 SOLUTION/ DROPS OPHTHALMIC at 09:11

## 2022-09-13 NOTE — PROGRESS NOTES
[facility]  Office: [address], DO, Millie Morgan, DO, Indra Jeevan, DO, Stan Roach Blood, DO, Pb Barger MD, Petra Ramsey MD, Kenyon Rock MD, José Miguel So MD,  Gema Diaz MD, Tiffany Huston MD, Clay Montenegro DO, Morris Ruiz MD,  Aliya Aceves MD, Rossy Barrientos MD, Linh Soliman DO, Inessa Grijalva MD, Fany Alonso MD, Mathew Lau MD, Prashanth Beltre MD, Melinda Salas MD, Shani Vega MD, Saúl Rodriguez DO, Wesley Sandhu MD, Gabrielle Penny MD, Brian Polanco, CNP,  Radha Yip, CNP, Alexsander Christopher, CNP, Ant Briceno, CNP,  Patience aTvares, DNP, Levon Anderson, CNP, Shayan Collier, CNP, Lorna Bazzi, CNP, Morgan Gilliam, CNP, Devin Dowd, CNP, ALEXEI LiuC, Gabi Baer, CNS, Alexandra Clark, DNP, Jaki Shelton, CNP, Mich Hernandez, CNP, Janett Jiménez 9742    Progress Note    9/13/2022    12:11 PM    Name:   Kirsty Crouch  MRN:     [id number]     [place]:      [de-identified]   Room:   [address] [place] [address] Day:  1  Admit Date:  9/11/2022  6:29 PM    PCP:   Kayleen Valladares MD  Code Status:  DNR-CC    Subjective:     C/C:   Chief Complaint   Patient presents with    Head Injury     Patient seen in follow-up for fall and anemia, patient states \"I want to go home\"    Interval History Status: improved. Patient's hemoglobin has stabilized following blood transfusion. Although she had a large scalp hematoma her anemia may be secondary to her AML. Daughter is at the bedside, I had a long discussion with the daughter regarding prognosis of AML especially at the age of 79. 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 I did review MAC pneumonia with the patient's daughter and explained that unfortunately the patient's diagnosis of AML is much more concerning than MAC.   Many patients do not need treatment for MAC. I did explain that with the patient's immunocompromise state with her AML it is reasonable to treat the MAC even though she is asymptomatic. We did discuss that treatment is antibiotics for a very prolonged period of time. Patient is tolerating azithromycin and it is reasonable to continue her on a azithromycin 3 times weekly indefinitely. Unfortunately with the patient's diagnosis of AML she is more likely to succumb to AML than MAC. Patient's daughter appreciates all the information. At this point in time following transfusion her hemoglobin is stable. She can be discharged back home under hospice care. Brief History: This 26-year-old female who was recently diagnosed with AML presented to hospital following a fall. She was found to have anemia and has been admitted for blood transfusion in addition for recommendations regarding a recent diagnosis of MAC. Review of Systems:     Review of systems limited, patient not very conversive and the daughter provides majority of her history taking. The patient herself complains of some generalized fatigue but otherwise has no other constitutional complaints. Medications: Allergies:     Allergies   Allergen Reactions    Latex Itching and Swelling    Ciprofloxacin     Ketorolac        Current Meds:   Scheduled Meds:    dorzolamide  1 drop Both Eyes BID    timolol  1 drop Both Eyes Daily    sodium chloride flush  5-40 mL IntraVENous 2 times per day    azithromycin  500 mg IntraVENous Once    melatonin  5 mg Oral Once    DULoxetine  60 mg Oral Daily    methylphenidate  5 mg Oral See Admin Instructions    cefTRIAXone (ROCEPHIN) IV  1,000 mg IntraVENous Q24H    azithromycin  500 mg IntraVENous Q24H     Continuous Infusions:    sodium chloride      sodium chloride      sodium chloride       PRN Meds: sodium chloride flush, sodium chloride, potassium chloride **OR** potassium alternative oral replacement **OR** potassium chloride, magnesium sulfate, ondansetron **OR** ondansetron, polyethylene glycol, acetaminophen **OR** acetaminophen, sodium chloride, ALPRAZolam, melatonin, sodium chloride    Data:     Past Medical History:   has a past medical history of Anxiety, Basal cell carcinoma of skin of lower limb, including hip, Basal cell carcinoma of skin of other and unspecified parts of face, Cerebral artery occlusion with cerebral infarction ([address] Colitis, Diarrhea, and Glaucoma. Social History:   reports that she has never smoked. She has never used smokeless tobacco. She reports that she does not drink alcohol and does not use drugs. Family History: History reviewed. No pertinent family history. Vitals:  BP (!) 125/53   Pulse 84   Temp 97.7 °F (36.5 °C)   Resp 17   Ht 5' 2\" (1.575 m)   Wt 138 lb 7.2 oz (62.8 kg)   SpO2 98%   BMI 25.32 kg/m²   Temp (24hrs), Av °F (36.7 °C), Min:97.7 °F (36.5 °C), Max:98.4 °F (36.9 °C)    No results for input(s): POCGLU in the last 72 hours. I/O (24Hr):     Intake/Output Summary (Last 24 hours) at 2022 1211  Last data filed at 2022 0309  Gross per 24 hour   Intake 722.33 ml   Output 200 ml   Net 522.33 ml       Labs:  Hematology:  Recent Labs     22  1916 22  2101 22  1900 22  0502   WBC 3.2*  --   --  3.6   RBC 2.33*  --   --  2.87*   HGB 7.1* 6.4* 9.0* 8.8*   HCT 21.6* 19.4* 26.6* 25.6*   MCV 92.7  --   --  89.2   MCH 30.3  --   --  30.5   MCHC 32.7  --   --  34.2   RDW 19.4*  --   --  16.3*   PLT 78*  --   --  200   MPV 12.0  --   --  12.2*   INR  --   --   --  1.1     Chemistry:  Recent Labs     22  0502   * 130*   K 4.2 3.9   CL 94* 95*   CO2 27 28   GLUCOSE 118* 150*   BUN 30* 25*   CREATININE 0.59 0.68   ANIONGAP 9 7*   LABGLOM >60 >60   GFRAA >60 >60   CALCIUM 8.4* 8.3*   No results for input(s): PROT, LABALBU, LABA1C, E0UYUZA, T4EAHHV, FT4, TSH, AST, ALT, LDH, GGT, ALKPHOS, LABGGT, BILITOT, BILIDIR, AMMONIA, AMYLASE, LIPASE, LACTATE, CHOL, HDL, LDLCHOLESTEROL, CHOLHDLRATIO, TRIG, VLDL, UYB09PJ, PHENYTOIN, PHENYF, URICACID, POCGLU in the last 72 hours. ABG:No results found for: POCPH, PHART, PH, POCPCO2, MGD8ZKF, PCO2, POCPO2, PO2ART, PO2, POCHCO3, OOE2SGG, HCO3, NBEA, PBEA, BEART, BE, THGBART, THB, ZLM4JZG, KZGK1BHH, Y8RNEHHF, O2SAT, FIO2  Lab Results   Component Value Date/Time    SPECIAL 3ML LEFT ANTECUBITAL 09/12/2022 06:55 PM     Lab Results   Component Value Date/Time    CULTURE NO GROWTH 12 HOURS 09/12/2022 06:55 PM       Radiology:  XR CHEST (2 VW)    Result Date: 9/11/2022  Right perihilar consolidative infiltrate. Fairly diffuse interstitial infiltrate throughout the remaining right lung suggesting pneumonia. Moderate right pleural effusion. RECOMMENDATION: Recommend follow-up exams until the infiltrate clears     CT HEAD WO CONTRAST    Result Date: 9/11/2022  Mild central and cortical cerebral atrophy. Moderate chronic deep white matter ischemic changes No acute intracranial abnormalities are noted. Left parietal scalp hematoma     CT CERVICAL SPINE WO CONTRAST    Result Date: 9/11/2022  1. No acute abnormality of the cervical spine. 2. Diffuse degenerative changes of mild-to-moderate severity. 3. Acquired cervical spinal canal stenosis mild-to-moderate severity, most severe at C6-7 at 6 mm AP dimension. MRI correlation may be indicated patient develops cord related or radicular-type symptoms. According gross may cannot be excluded. 4. Partial visualization of large volume right pleural effusion.        Physical Examination:       General appearance:  alert, cooperative and no distress  Mental Status: Oriented to self  Lungs:  clear to auscultation bilaterally, normal effort  Heart:  regular rate and rhythm, no murmur  Abdomen:  soft, nontender, nondistended, normal bowel sounds, no masses, hepatomegaly, splenomegaly  Extremities:  no edema, redness, tenderness in the calves  Skin:  no gross lesions, rashes, induration    Assessment:     Hospital Problems             Last Modified POA    * (Principal) Acute blood loss anemia 9/12/2022 Yes    Acute myeloblastic leukemia, not having achieved remission ([facility] 9/12/2022 Yes    Anxiety disorder 9/12/2022 Yes    Dementia ([facility] 9/12/2022 Yes    Pancytopenia ([facility] 9/12/2022 Yes    Pneumonia due to infectious organism 9/12/2022 Yes    Anemia 9/12/2022 Yes    Pleural effusion 9/12/2022 Yes    Fall 9/12/2022 Yes       Plan:     Anemia  Anemia multifactorial secondary to fall and hematoma/scalp laceration complicated by AML  Hemoglobin stable following blood transfusion  AML  I had an extensive conversation with the patient's daughter regarding long-term prognosis. I do agree with hospice given patient's age and comorbidities. Okay to discharge home with hospice care. MAC  As per discussion above I am not sure that the patient even needs treatment however given her immunocompromise state and from a palliative perspective it is reasonable to continue her on azithromycin 3 times weekly.     Discharge home    Amena Estes DO  9/13/2022  12:11 PM

## 2022-09-13 NOTE — CARE COORDINATION
09/13/22 1028   Service Assessment   History Provided By Child/Family   Primary Caregiver Family   Support Systems Children;Family Members   Prior Functional Level Assistance with the following:   Current Functional Level Assistance with the following:   Can patient return to prior living arrangement Yes   Ability to make needs known: Poor   Social/Functional History   Lives With Family   ADL Assistance Needs assistance   Ambulation Assistance Needs assistance   Transfer Assistance Needs assistance   Active  No   Patient's  Info daughter   Discharge Planning   Patient expects to be discharged to: [address] Discharge   Mode of Transport at Discharge Self   Confirm Follow Up Transport Family   Condition of Participation: Discharge Planning   The Patient and/or Patient Representative was provided with a Choice of Provider? Patient   The Patient and/Or Patient Representative agree with the Discharge Plan?  Yes       D/c home with daughter and [address]

## 2022-09-13 NOTE — DISCHARGE SUMMARY
[facility]  Office: [address], DO, Lani Khanna, DO, Damaris Reina, DO, Brigitte Ashkan Blood, DO, Chan Mike MD, Heri Vazquez MD, Sergo Duron MD, Emely Ku MD,  Guy Cowan MD, Kimberly Dueñas MD, Oscar Dunbar, DO, Doris Peters MD,  Salvatore Morrison MD, Antonieta Messina MD, William Fletcher DO, Ovidio Xiao MD, Francie Baker MD, Eula Moulton MD, Dianna Castro MD, Sammie Winchester MD, Kamari Moreno MD, Erna Moreland DO, Lalita Ng MD, Georgiana Vargas MD, Demond Vargas, CNP,  Maria A Valero, CNP, Luther Garza, CNP, Anand Figueroa, CNP,  Cleone Denver, DNP, Lissa Patten, CNP, Duong Herrera, CNP, Katarina Lacey, CNP, Shola Brito, CNP, Helena Nieves, CNP, Lux Jay PA-C, Geeta Christensen, CNS, Glendy Juarez, [facility], Marie Jay, CNP, Gina Barger, CNP, Janett Damian 1539    Discharge Summary     Patient ID: Wilian Hamilton  :  1940   MRN: [id number]     ACCOUNT:  [de-identified]   Patient's PCP: Pearl Masterson MD  Admit Date: 2022   Discharge Date: 2022     Length of Stay: 1  Code Status:  DNR-CC  Admitting Physician: No admitting provider for patient encounter. Discharge Physician: Katrin Beard DO     Active Discharge Diagnoses:     Hospital Problem Lists:  Principal Problem:    Acute blood loss anemia  Active Problems:    Acute myeloblastic leukemia, not having achieved remission (HCC)    Anxiety disorder    Dementia (HCC)    Pancytopenia (HCC)    Pneumonia due to infectious organism    Anemia    Pleural effusion    Fall  Resolved Problems:    * No resolved hospital problems.  *      Admission Condition:  poor     Discharged Condition: fair    Hospital Stay:     Hospital Course:  Wilian Hamilton is a 79 y.o. female who was admitted for the management of  Acute blood loss anemia , presented to ER with Head Injury    This very pleasant 70-year-old female with AML and a recent diagnosis of MAC was brought to the hospital by family after she suffered a fall and sustained a scalp laceration. She was anemic and ultimately admitted for blood transfusion. Her anemia is likely multifactorial from an element of blood loss anemia from her scalp laceration in addition to her AML. Regarding her MAC I am going to start her on azithromycin 3 times weekly. I did have a long discussion with the patient's daughter regarding the fact that her diagnosis of AML is the more concerning of the 2 diagnoses. I did explain that prognosis at 79years of age with AML is quite poor. Patient is already established with hospice and ultimately is discharged home following 2 units of packed red blood cells in stable condition. Long-term prognosis is obviously poor given her age and AML diagnosis. Significant therapeutic interventions: As above    Significant Diagnostic Studies:   Labs / Micro:  CBC:   Lab Results   Component Value Date/Time    WBC 3.6 09/13/2022 05:02 AM    RBC 2.87 09/13/2022 05:02 AM    HGB 8.8 09/13/2022 05:02 AM    HCT 25.6 09/13/2022 05:02 AM    MCV 89.2 09/13/2022 05:02 AM    MCH 30.5 09/13/2022 05:02 AM    MCHC 34.2 09/13/2022 05:02 AM    RDW 16.3 09/13/2022 05:02 AM     09/13/2022 05:02 AM     BMP:    Lab Results   Component Value Date/Time    GLUCOSE 150 09/13/2022 05:02 AM     09/13/2022 05:02 AM    K 3.9 09/13/2022 05:02 AM    CL 95 09/13/2022 05:02 AM    CO2 28 09/13/2022 05:02 AM    ANIONGAP 7 09/13/2022 05:02 AM    BUN 25 09/13/2022 05:02 AM    CREATININE 0.68 09/13/2022 05:02 AM    CALCIUM 8.3 09/13/2022 05:02 AM    LABGLOM >60 09/13/2022 05:02 AM    GFRAA >60 09/13/2022 05:02 AM    GFR      09/13/2022 05:02 AM        Radiology:  XR CHEST (2 VW)    Result Date: 9/11/2022  Right perihilar consolidative infiltrate. Fairly diffuse interstitial infiltrate throughout the remaining right lung suggesting pneumonia. Moderate right pleural effusion. RECOMMENDATION: Recommend follow-up exams until the infiltrate clears     CT HEAD WO CONTRAST    Result Date: 9/11/2022  Mild central and cortical cerebral atrophy. Moderate chronic deep white matter ischemic changes No acute intracranial abnormalities are noted. Left parietal scalp hematoma     CT CERVICAL SPINE WO CONTRAST    Result Date: 9/11/2022  1. No acute abnormality of the cervical spine. 2. Diffuse degenerative changes of mild-to-moderate severity. 3. Acquired cervical spinal canal stenosis mild-to-moderate severity, most severe at C6-7 at 6 mm AP dimension. MRI correlation may be indicated patient develops cord related or radicular-type symptoms. According gross may cannot be excluded. 4. Partial visualization of large volume right pleural effusion. Consultations:    Consults:     Final Specialist Recommendations/Findings:   IP CONSULT TO INFECTIOUS DISEASES  IP CONSULT TO PULMONOLOGY      The patient was seen and examined on day of discharge and this discharge summary is in conjunction with any daily progress note from day of discharge. Discharge plan:     Disposition: Home    Physician Follow Up: Ella [address]  [phone]    Schedule an appointment as soon as possible for a visit         Requiring Further Evaluation/Follow Up POST HOSPITALIZATION/Incidental Findings: None    Diet: regular diet    Activity: As tolerated    Instructions to Patient: None    Discharge Medications:      Medication List        START taking these medications      azithromycin 250 MG tablet  Commonly known as: Zithromax  1 tab PO Monday, Wednesday, Friday            CHANGE how you take these medications      * ALPRAZolam 0.25 MG tablet  Commonly known as: Charlotte Celso  What changed: Another medication with the same name was removed. Continue taking this medication, and follow the directions you see here.      * ALPRAZolam 0.25 MG tablet  Commonly known as: Louis Mcclellan  What changed: Another medication with the same name was removed. Continue taking this medication, and follow the directions you see here. melatonin 3 MG Tabs tablet  What changed: Another medication with the same name was removed. Continue taking this medication, and follow the directions you see here. ondansetron 4 MG tablet  Commonly known as: Sridhar Chang  What changed: Another medication with the same name was removed. Continue taking this medication, and follow the directions you see here. * This list has 2 medication(s) that are the same as other medications prescribed for you. Read the directions carefully, and ask your doctor or other care provider to review them with you.                 CONTINUE taking these medications      dorzolamide 2 % ophthalmic solution  Commonly known as: TRUSOPT     DULoxetine 60 MG extended release capsule  Commonly known as: CYMBALTA     methylphenidate 5 MG tablet  Commonly known as: RITALIN     oxybutynin 5 MG tablet  Commonly known as: DITROPAN     timolol 0.5 % ophthalmic solution  Commonly known as: TIMOPTIC            STOP taking these medications      Apriso 0.375 g extended release capsule  Generic drug: mesalamine     calcium carbonate 500 MG Tabs tablet  Commonly known as: OSCAL     cephALEXin 250 MG capsule  Commonly known as: KEFLEX     DAILY MULTIPLE VITAMINS/MIN PO     lamoTRIgine 150 MG tablet  Commonly known as: LAMICTAL     sertraline 25 MG tablet  Commonly known as: ZOLOFT     vitamin C 500 MG tablet  Commonly known as: ASCORBIC ACID     Vitamin D 1000 units Caps capsule  Commonly known as: CHOLECALCIFEROL               Where to Get Your Medications        You can get these medications from any pharmacy    Bring a paper prescription for each of these medications  azithromycin 250 MG tablet         Discharge Procedure Orders   Deidre Mathew MD, Infectious Disease, [place]   Referral Priority: Routine Referral Type: Eval and Treat   Referral Reason: Specialty Services Required   Referred to Provider: Del Sorensen Requested Specialty: Infectious Diseases   Number of Visits Requested: 1       Time Spent on discharge is  20 mins in patient examination, evaluation, counseling as well as medication reconciliation, prescriptions for required medications, discharge plan and follow up. Electronically signed by   Maria Teresa Law DO  9/13/2022  12:16 PM      Thank you Dr. America Hooper MD for the opportunity to be involved in this patient's care.

## 2022-09-13 NOTE — CONSULTS
PULMONARY & CRITICAL CARE MEDICINE CONSULT NOTE     Patient:  Polo Frederick  MRN: [id number]  Admit date: 9/11/2022  Primary Care Physician: Tanja Ponce MD  CODE Status: DNR-CC  LOS: 0  Inpatient consult to Pulmonology  Consult performed by: Nilton Segal MD  Consult ordered by: Seven Mcmillan MD       SUBJECTIVE     CHIEF COMPLAINT/REASON FOR CONSULT: Right-sided pleural effusion    HISTORY OF PRESENT ILLNESS:  The patient is a 79 y.o. female with history of dementia, AML who was brought in emergency room after a fall and injury to head. During evaluation patient was noted to have large left parietal scalp hematoma and her hemoglobin dropped down to 6.4. She is receiving PRBC transfusion. As per the history given by patient's daughter at bedside, patient was diagnosed to have CLL few months back and follows up with Dr. Alan Montesinos. Patient gets most of his care from [facility] and hospital records were not available for review at the time of consultation. According to the daughter, patient was recently followed have \"atypical mycobacterial\" infection. During evaluation in the ED a chest x-ray is reported to show right perihilar consolidative infiltrate with interstitial markings and a moderate sized pleural effusion.     PAST MEDICAL HISTORY:        Diagnosis Date    Anxiety     Basal cell carcinoma of skin of lower limb, including hip 10/22/2014    Basal cell carcinoma of skin of other and unspecified parts of face 10/22/2014    Cerebral artery occlusion with cerebral infarction [facility]) 2017    Colitis     Diarrhea     malabsorption    Glaucoma      PAST SURGICAL HISTORY:        Procedure Laterality Date    ANKLE SURGERY      APPENDECTOMY      COLONOSCOPY      CYST REMOVAL Left 1994    wrist    MANDIBLE SURGERY      correction of overbite    PRE-MALIGNANT / BENIGN SKIN LESION EXCISION  03/30/2022    SKIN BIOPSY  1992    upper back--nodular subepidermal fibroma, back, neck--epidermal inclusion cyst    SKIN BIOPSY  2009    nose--bcc    SKIN BIOPSY  2012    rt shin--bcc, rt cheek--epidermoid cyst    SKIN BIOPSY N/A 3/30/2022    BACK LESION BIOPSY EXCISION performed by Patricia Cedillo MD at [facility]:   History reviewed. No pertinent family history. SOCIAL HISTORY:   TOBACCO: reports that she has never smoked. She has never used smokeless tobacco.  ETOH:  reports no history of alcohol use. DRUGS: reports no history of drug use. ALLERGIES:    Allergies   Allergen Reactions    Latex Itching and Swelling    Ciprofloxacin     Ketorolac          HOME MEDICATIONS:  Prior to Admission medications    Medication Sig Start Date End Date Taking? Authorizing Provider   melatonin 3 MG TABS tablet Take 5 mg by mouth at bedtime   Yes Historical Provider, MD   ondansetron (ZOFRAN) 4 MG tablet Take 4 mg by mouth every 6 hours as needed for Nausea or Vomiting   Yes Historical Provider, MD   ALPRAZolam (XANAX) 0.25 MG tablet Take 0.25 mg by mouth 3 times daily as needed for Sleep. Yes Historical Provider, MD   ALPRAZolam (XANAX) 0.25 MG tablet Take 0.25 mg by mouth 2 times daily. Yes Historical Provider, MD   oxybutynin (DITROPAN) 5 MG tablet Take 2.5 mg by mouth 2 times daily   Yes Historical Provider, MD   dorzolamide (TRUSOPT) 2 % ophthalmic solution Place 1 drop into both eyes daily    Historical Provider, MD   methylphenidate (RITALIN) 5 MG tablet Take 10 mg by mouth daily. Per daughter 10/20/14   Historical Provider, MD   timolol (TIMOPTIC) 0.5 % ophthalmic solution Place 1 drop into both eyes daily. 10/15/14   Historical Provider, MD   lamoTRIgine (LAMICTAL) 150 MG tablet Take 150 mg by mouth daily. Patient not taking: Reported on 9/11/2022 10/22/14 9/12/22  Historical Provider, MD   APRISO 0.375 G capsule Take 1.5 g by mouth daily.   Patient not taking: Reported on 9/11/2022 10/20/14 9/12/22  Historical Provider, MD   sertraline (ZOLOFT) 25 MG tablet reactive to light. Cardiovascular:      Rate and Rhythm: Normal rate. Rhythm irregularly irregular. Heart sounds: No murmur heard. Pulmonary:      Effort: Pulmonary effort is normal.      Breath sounds: Examination of the right-middle field reveals decreased breath sounds. Examination of the right-lower field reveals decreased breath sounds. Decreased breath sounds present. Abdominal:      General: There is no distension. Palpations: Abdomen is soft. There is no mass. Tenderness: There is no abdominal tenderness. Musculoskeletal:      Cervical back: Neck supple. Right lower le+ Edema present. Left lower le+ Edema present. Skin:     Coloration: Skin is not pale. Findings: No rash. Neurological:      General: No focal deficit present. Mental Status: She is alert and oriented to person, place, and time. DATA REVIEW     CURRENT MEDICATIONS:  Scheduled Meds:   dorzolamide  1 drop Both Eyes BID    timolol  1 drop Both Eyes Daily    sodium chloride flush  5-40 mL IntraVENous 2 times per day    azithromycin  500 mg IntraVENous Once    melatonin  5 mg Oral Once    ALPRAZolam  0.25 mg Oral Daily    DULoxetine  60 mg Oral Daily    methylphenidate  5 mg Oral See Admin Instructions    [START ON 2022] cefTRIAXone (ROCEPHIN) IV  1,000 mg IntraVENous Q24H    [START ON 2022] azithromycin  500 mg IntraVENous Q24H     Continuous Infusions:   sodium chloride      sodium chloride      sodium chloride         INPUT/OUTPUT:  In: 722.3 [Blood:672.3]  Out: -   Date 22 0000 - 22 2359   Shift 4806-5189 3111-9741 4410-5745 24 Hour Total   INTAKE   Blood(mL/kg)  322. 3(5.9) 350(6.4) 672. 3(12.4)   IV Piggyback(mL/kg)   50(0.9) 50(0.9)   Shift Total(mL/kg)  322. 3(5.9) 400(7.3) 722. 3(13.3)   OUTPUT   Shift Total(mL/kg)       Weight (kg) 54.4 54.4 54.4 54.4        LABORATORY RESULTS:  BLOOD GASES:   No results for input(s): POCPH, POCPCO2, POCPO2, POCHCO3, [id number] in the last 72 hours. COMPLETE BLOOD COUNTS:   Recent Labs     09/11/22 1916 09/11/22  2101 09/12/22  1900   WBC 3.2*  --   --    HGB 7.1* 6.4* 9.0*   HCT 21.6* 19.4* 26.6*   MCV 92.7  --   --    PLT 78*  --   --    LYMPHOPCT 21*  --   --    RBC 2.33*  --   --    MCH 30.3  --   --    MCHC 32.7  --   --    RDW 19.4*  --   --      C-REACTIVE PROTEIN:   No results for input(s): CRP in the last 72 hours. LACTATE DEHYDROGENASE:   No results for input(s): LDH in the last 72 hours. BASIC METABOLIC PROFILE:   Recent Labs     09/11/22 1916   *   K 4.2   CL 94*   CO2 27   BUN 30*   CREATININE 0.59   GLUCOSE 118*     LIVER FUNCTION TESTS:   No results for input(s): PROT, LABALBU, ALT, AST, GGT, ALKPHOS, BILITOT in the last 72 hours. COAGULATION PROFILE:   No results for input(s): INR, PROTIME, APTT in the last 72 hours. D-DIMER:  No results for input(s): DDIMER in the last 72 hours. LACTIC ACID:  No results for input(s): LACTA in the last 72 hours. CARDIAC ENZYMES:  No results for input(s): CKTOTAL, CKMB, CKMBINDEX, TROPONINI in the last 72 hours. Invalid input(s): TROPONIN, HSTROP  BRAIN NATRIURETIC PEPTIDE/PRO-BRAIN NATRURETIC PEPTIDE:   No results for input(s): BNP, PROBNP in the last 72 hours. TRIGLYCERIDES:  No results for input(s): TRIG in the last 72 hours. MICROBIOLOGY RESULTS:  URINE CULTURE: No components found for: CURINE  BLOOD CULTURE: No components found for: CBLOOD, CFUNGUSBL  SPUTUM CULTURE: No components found for: CSPUTUM    Recent Labs     09/12/22  1847 09/12/22  1855   SPECDESC . BLOOD . BLOOD   SPECIAL 5ML RIGHT ANTECUBITAL 3ML LEFT ANTECUBITAL   CULTURE NO GROWTH <24 HRS NO GROWTH <24 HRS        PATHOLOGY RESULTS:    RADIOLOGY REPORTS:  XR CHEST (2 VW)   Final Result   Right perihilar consolidative infiltrate. Fairly diffuse interstitial   infiltrate throughout the remaining right lung suggesting pneumonia. Moderate right pleural effusion.       RECOMMENDATION:   Recommend follow-up exams until the infiltrate clears         CT HEAD WO CONTRAST   Final Result   Mild central and cortical cerebral atrophy. Moderate chronic deep white matter ischemic changes      No acute intracranial abnormalities are noted. Left parietal scalp hematoma         CT CERVICAL SPINE WO CONTRAST   Final Result   1. No acute abnormality of the cervical spine. 2. Diffuse degenerative changes of mild-to-moderate severity. 3. Acquired cervical spinal canal stenosis mild-to-moderate severity, most   severe at C6-7 at 6 mm AP dimension. MRI correlation may be indicated   patient develops cord related or radicular-type symptoms. According gross   may cannot be excluded. 4. Partial visualization of large volume right pleural effusion. ECHOCARDIOGRAM:   No results found for this or any previous visit. 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     Patient is hemodynamically stable  Currently saturating well on supplemental oxygen with nasal cannula @ O2 Flow Rate (L/min)  Avg: 3 L/min  Min: 3 L/min  Max: 3 L/min  Continue supplemental oxygen to keep oxygen saturation >90%  Continue nocturnal and as needed BiPAP  We will try to obtain [facility] from [facility], before making any decisions regarding need for thoracentesis  If thoracentesis is required patient will need to go to IR at [place] for the procedure  Encourage incentive spirometry/Acapella  Maintain bronchopulmonary hygiene  Continue aspiration precautions  Continue bronchodilators  Antimicrobials reviewed; continue Rocephin/azithromycin  Follow-up culture results  Monitor I/O, electrolytes with a goal of even/negative fluid balance  DVT and stress ulcer prophylaxis  Physical/occupational therapy    It was my pleasure to evaluate Wilian Hamilton today. I would like to thank you for allowing me to participate in the care of this patient. Please feel free to call with any further questions or concerns. We will continue to follow. Duyen Juarez MD  Pulmonary and Critical Care Medicine           9/12/2022    Please note that this chart was generated using voice recognition Dragon dictation software. Although every effort was made to ensure the accuracy of this automated transcription, some errors in transcription may have occurred.

## 2022-09-17 LAB
CULTURE: NORMAL
CULTURE: NORMAL
Lab: NORMAL
Lab: NORMAL
SPECIMEN DESCRIPTION: NORMAL
SPECIMEN DESCRIPTION: NORMAL
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
## (undated) DEVICE — GLOVE ORANGE PI 7 1/2   MSG9075

## (undated) DEVICE — SOLUTION IV IRRIG POUR BRL 0.9% SODIUM CHL 2F7124

## (undated) DEVICE — PENCIL ES L3M BTTN SWCH HOLSTER W/ BLDE ELECTRD EDGE

## (undated) DEVICE — SUTURE PROL SZ 4-0 L18IN NONABSORBABLE BLU L16MM PC-3 3/8 8634G

## (undated) DEVICE — SUTURE VCRL SZ 4-0 L18IN ABSRB UD L13MM P-3 3/8 CIR PRIM J494H

## (undated) DEVICE — MHPB GEN MINOR PACK: Brand: MEDLINE INDUSTRIES, INC.

## (undated) DEVICE — ELECTRODE PT RET AD L9FT HI MOIST COND ADH HYDRGEL CORDED

## (undated) DEVICE — STRIP,CLOSURE,WOUND,MEDI-STRIP,1/2X4: Brand: MEDLINE

## (undated) DEVICE — SOLUTION SURG PREP POV IOD 7.5% 4 OZ

## (undated) DEVICE — Z DISCONTINUED BY MEDLINE USE 2711682 TRAY SKIN PREP DRY W/ PREM GLV